# Patient Record
Sex: FEMALE | Race: WHITE | NOT HISPANIC OR LATINO | URBAN - METROPOLITAN AREA
[De-identification: names, ages, dates, MRNs, and addresses within clinical notes are randomized per-mention and may not be internally consistent; named-entity substitution may affect disease eponyms.]

---

## 2017-01-03 ENCOUNTER — COMMUNICATION - HEALTHEAST (OUTPATIENT)
Dept: PEDIATRICS | Facility: CLINIC | Age: 82
End: 2017-01-03

## 2017-01-11 ASSESSMENT — MIFFLIN-ST. JEOR: SCORE: 1072.72

## 2017-01-12 ENCOUNTER — ANESTHESIA - HEALTHEAST (OUTPATIENT)
Dept: SURGERY | Facility: CLINIC | Age: 82
End: 2017-01-12

## 2017-01-13 ENCOUNTER — SURGERY - HEALTHEAST (OUTPATIENT)
Dept: SURGERY | Facility: CLINIC | Age: 82
End: 2017-01-13

## 2017-01-14 ASSESSMENT — MIFFLIN-ST. JEOR: SCORE: 1072.72

## 2017-01-15 ENCOUNTER — HOME CARE/HOSPICE - HEALTHEAST (OUTPATIENT)
Dept: HOME HEALTH SERVICES | Facility: HOME HEALTH | Age: 82
End: 2017-01-15

## 2017-01-15 ASSESSMENT — MIFFLIN-ST. JEOR: SCORE: 1110.14

## 2017-01-17 ENCOUNTER — COMMUNICATION - HEALTHEAST (OUTPATIENT)
Dept: INTERNAL MEDICINE | Facility: CLINIC | Age: 82
End: 2017-01-17

## 2017-01-18 ENCOUNTER — COMMUNICATION - HEALTHEAST (OUTPATIENT)
Dept: INTERNAL MEDICINE | Facility: CLINIC | Age: 82
End: 2017-01-18

## 2017-01-19 ENCOUNTER — HOME CARE/HOSPICE - HEALTHEAST (OUTPATIENT)
Dept: HOME HEALTH SERVICES | Facility: HOME HEALTH | Age: 82
End: 2017-01-19

## 2017-01-19 ENCOUNTER — OFFICE VISIT - HEALTHEAST (OUTPATIENT)
Dept: INTERNAL MEDICINE | Facility: CLINIC | Age: 82
End: 2017-01-19

## 2017-01-19 DIAGNOSIS — M48.061 LUMBAR SPINAL STENOSIS: ICD-10-CM

## 2017-01-19 DIAGNOSIS — I25.10 CORONARY ATHEROSCLEROSIS: ICD-10-CM

## 2017-01-19 DIAGNOSIS — D64.9 ANEMIA: ICD-10-CM

## 2017-01-19 DIAGNOSIS — N18.30 CHRONIC KIDNEY DISEASE, STAGE III (MODERATE) (H): ICD-10-CM

## 2017-01-19 DIAGNOSIS — I10 ESSENTIAL HYPERTENSION: ICD-10-CM

## 2017-01-19 DIAGNOSIS — E78.5 DYSLIPIDEMIA, GOAL LDL BELOW 70: ICD-10-CM

## 2017-01-19 DIAGNOSIS — E03.9 HYPOTHYROIDISM: ICD-10-CM

## 2017-01-19 DIAGNOSIS — Z98.890 STATUS POST LUMBAR SPINE OPERATION: ICD-10-CM

## 2017-01-19 RX ORDER — NITROGLYCERIN 0.4 MG/1
0.4 TABLET SUBLINGUAL EVERY 5 MIN PRN
Qty: 100 TABLET | Refills: 0 | Status: SHIPPED | OUTPATIENT
Start: 2017-01-19

## 2017-01-20 ENCOUNTER — COMMUNICATION - HEALTHEAST (OUTPATIENT)
Dept: HOME HEALTH SERVICES | Facility: HOME HEALTH | Age: 82
End: 2017-01-20

## 2017-01-24 ENCOUNTER — RECORDS - HEALTHEAST (OUTPATIENT)
Dept: ADMINISTRATIVE | Facility: OTHER | Age: 82
End: 2017-01-24

## 2017-01-26 ENCOUNTER — COMMUNICATION - HEALTHEAST (OUTPATIENT)
Dept: INTERNAL MEDICINE | Facility: CLINIC | Age: 82
End: 2017-01-26

## 2017-01-27 ENCOUNTER — COMMUNICATION - HEALTHEAST (OUTPATIENT)
Dept: SCHEDULING | Facility: CLINIC | Age: 82
End: 2017-01-27

## 2017-01-27 ENCOUNTER — OFFICE VISIT - HEALTHEAST (OUTPATIENT)
Dept: FAMILY MEDICINE | Facility: CLINIC | Age: 82
End: 2017-01-27

## 2017-01-27 DIAGNOSIS — R05.9 COUGH: ICD-10-CM

## 2017-02-04 ENCOUNTER — RECORDS - HEALTHEAST (OUTPATIENT)
Dept: ADMINISTRATIVE | Facility: OTHER | Age: 82
End: 2017-02-04

## 2017-02-06 ENCOUNTER — RECORDS - HEALTHEAST (OUTPATIENT)
Dept: ADMINISTRATIVE | Facility: OTHER | Age: 82
End: 2017-02-06

## 2017-02-13 ENCOUNTER — RECORDS - HEALTHEAST (OUTPATIENT)
Dept: ADMINISTRATIVE | Facility: OTHER | Age: 82
End: 2017-02-13

## 2017-02-13 ENCOUNTER — COMMUNICATION - HEALTHEAST (OUTPATIENT)
Dept: INTERNAL MEDICINE | Facility: CLINIC | Age: 82
End: 2017-02-13

## 2017-02-14 ENCOUNTER — RECORDS - HEALTHEAST (OUTPATIENT)
Dept: ADMINISTRATIVE | Facility: OTHER | Age: 82
End: 2017-02-14

## 2017-02-17 ENCOUNTER — AMBULATORY - HEALTHEAST (OUTPATIENT)
Dept: LAB | Facility: CLINIC | Age: 82
End: 2017-02-17

## 2017-02-17 DIAGNOSIS — D64.9 ANEMIA: ICD-10-CM

## 2017-02-18 ENCOUNTER — COMMUNICATION - HEALTHEAST (OUTPATIENT)
Dept: INTERNAL MEDICINE | Facility: CLINIC | Age: 82
End: 2017-02-18

## 2017-02-23 ENCOUNTER — OFFICE VISIT - HEALTHEAST (OUTPATIENT)
Dept: INTERNAL MEDICINE | Facility: CLINIC | Age: 82
End: 2017-02-23

## 2017-02-23 DIAGNOSIS — J30.81 ALLERGIC RHINITIS DUE TO DOG HAIR: ICD-10-CM

## 2017-02-23 DIAGNOSIS — I10 ESSENTIAL HYPERTENSION WITH GOAL BLOOD PRESSURE LESS THAN 140/90: ICD-10-CM

## 2017-02-23 RX ORDER — LANOLIN ALCOHOL/MO/W.PET/CERES
3 CREAM (GRAM) TOPICAL
Status: SHIPPED | COMMUNITY
Start: 2017-02-20

## 2017-02-23 ASSESSMENT — MIFFLIN-ST. JEOR: SCORE: 1040.97

## 2017-04-25 ENCOUNTER — OFFICE VISIT - HEALTHEAST (OUTPATIENT)
Dept: INTERNAL MEDICINE | Facility: CLINIC | Age: 82
End: 2017-04-25

## 2017-04-25 DIAGNOSIS — R06.7 SNEEZING WITH WATERY EYES: ICD-10-CM

## 2017-04-25 DIAGNOSIS — H04.209 SNEEZING WITH WATERY EYES: ICD-10-CM

## 2017-04-25 DIAGNOSIS — R09.81 MILD NASAL CONGESTION: ICD-10-CM

## 2017-04-25 DIAGNOSIS — J30.9 ALLERGIC RHINITIS: ICD-10-CM

## 2017-04-25 ASSESSMENT — MIFFLIN-ST. JEOR: SCORE: 1045.5

## 2017-04-26 ENCOUNTER — RECORDS - HEALTHEAST (OUTPATIENT)
Dept: ADMINISTRATIVE | Facility: OTHER | Age: 82
End: 2017-04-26

## 2017-05-09 ENCOUNTER — COMMUNICATION - HEALTHEAST (OUTPATIENT)
Dept: SCHEDULING | Facility: CLINIC | Age: 82
End: 2017-05-09

## 2017-05-11 ENCOUNTER — OFFICE VISIT - HEALTHEAST (OUTPATIENT)
Dept: INTERNAL MEDICINE | Facility: CLINIC | Age: 82
End: 2017-05-11

## 2017-05-11 DIAGNOSIS — R51.9 CHRONIC HEADACHES: ICD-10-CM

## 2017-05-11 DIAGNOSIS — I10 ESSENTIAL HYPERTENSION WITH GOAL BLOOD PRESSURE LESS THAN 140/90: ICD-10-CM

## 2017-05-11 DIAGNOSIS — G89.29 CHRONIC HEADACHES: ICD-10-CM

## 2017-05-11 ASSESSMENT — MIFFLIN-ST. JEOR: SCORE: 1059.11

## 2017-05-12 ENCOUNTER — COMMUNICATION - HEALTHEAST (OUTPATIENT)
Dept: INTERNAL MEDICINE | Facility: CLINIC | Age: 82
End: 2017-05-12

## 2017-05-19 ENCOUNTER — COMMUNICATION - HEALTHEAST (OUTPATIENT)
Dept: INTERNAL MEDICINE | Facility: CLINIC | Age: 82
End: 2017-05-19

## 2017-05-23 ENCOUNTER — AMBULATORY - HEALTHEAST (OUTPATIENT)
Dept: INTERNAL MEDICINE | Facility: CLINIC | Age: 82
End: 2017-05-23

## 2017-05-25 ENCOUNTER — AMBULATORY - HEALTHEAST (OUTPATIENT)
Dept: INTERNAL MEDICINE | Facility: CLINIC | Age: 82
End: 2017-05-25

## 2017-05-25 DIAGNOSIS — G89.29 CHRONIC HEADACHES: ICD-10-CM

## 2017-05-25 DIAGNOSIS — R51.9 CHRONIC HEADACHES: ICD-10-CM

## 2017-07-24 ENCOUNTER — COMMUNICATION - HEALTHEAST (OUTPATIENT)
Dept: INTERNAL MEDICINE | Facility: CLINIC | Age: 82
End: 2017-07-24

## 2017-08-21 ENCOUNTER — COMMUNICATION - HEALTHEAST (OUTPATIENT)
Dept: SCHEDULING | Facility: CLINIC | Age: 82
End: 2017-08-21

## 2017-08-21 ENCOUNTER — HOSPITAL ENCOUNTER (OUTPATIENT)
Dept: LAB | Age: 82
Setting detail: SPECIMEN
Discharge: HOME | End: 2017-08-21

## 2017-08-21 ENCOUNTER — HOME CARE/HOSPICE - HEALTHEAST (OUTPATIENT)
Dept: HOME HEALTH SERVICES | Facility: HOME HEALTH | Age: 82
End: 2017-08-21

## 2017-08-21 ENCOUNTER — COMMUNICATION - HEALTHEAST (OUTPATIENT)
Dept: HOME HEALTH SERVICES | Facility: HOME HEALTH | Age: 82
End: 2017-08-21

## 2017-08-21 ENCOUNTER — OFFICE VISIT - HEALTHEAST (OUTPATIENT)
Dept: INTERNAL MEDICINE | Facility: CLINIC | Age: 82
End: 2017-08-21

## 2017-08-21 DIAGNOSIS — H35.30 MACULAR DEGENERATION: ICD-10-CM

## 2017-08-21 DIAGNOSIS — R51.9 FREQUENT HEADACHES: ICD-10-CM

## 2017-08-21 DIAGNOSIS — I10 HTN (HYPERTENSION): ICD-10-CM

## 2017-08-21 DIAGNOSIS — K59.00 CONSTIPATION: ICD-10-CM

## 2017-08-21 DIAGNOSIS — K92.1 BLOOD IN STOOL: ICD-10-CM

## 2017-09-07 ENCOUNTER — OFFICE VISIT - HEALTHEAST (OUTPATIENT)
Dept: INTERNAL MEDICINE | Facility: CLINIC | Age: 82
End: 2017-09-07

## 2017-09-07 DIAGNOSIS — L60.0 INGROWN NAIL OF GREAT TOE OF LEFT FOOT: ICD-10-CM

## 2017-09-11 ENCOUNTER — COMMUNICATION - HEALTHEAST (OUTPATIENT)
Dept: INTERNAL MEDICINE | Facility: CLINIC | Age: 82
End: 2017-09-11

## 2017-09-12 ENCOUNTER — OFFICE VISIT - HEALTHEAST (OUTPATIENT)
Dept: INTERNAL MEDICINE | Facility: CLINIC | Age: 82
End: 2017-09-12

## 2017-09-12 DIAGNOSIS — L60.0 INGROWN NAIL OF GREAT TOE OF LEFT FOOT: ICD-10-CM

## 2017-10-16 ENCOUNTER — COMMUNICATION - HEALTHEAST (OUTPATIENT)
Dept: INTERNAL MEDICINE | Facility: CLINIC | Age: 82
End: 2017-10-16

## 2017-10-18 ENCOUNTER — RECORDS - HEALTHEAST (OUTPATIENT)
Dept: ADMINISTRATIVE | Facility: OTHER | Age: 82
End: 2017-10-18

## 2017-11-06 ENCOUNTER — HOSPITAL ENCOUNTER (OUTPATIENT)
Dept: MRI IMAGING | Facility: HOSPITAL | Age: 82
Discharge: HOME | End: 2017-11-06
Attending: PSYCHIATRY & NEUROLOGY

## 2017-11-06 DIAGNOSIS — G44.40 REBOUND HEADACHE: ICD-10-CM

## 2017-11-09 ENCOUNTER — COMMUNICATION - HEALTHEAST (OUTPATIENT)
Dept: INTERNAL MEDICINE | Facility: CLINIC | Age: 82
End: 2017-11-09

## 2017-12-19 ENCOUNTER — RECORDS - HEALTHEAST (OUTPATIENT)
Dept: ADMINISTRATIVE | Facility: OTHER | Age: 82
End: 2017-12-19

## 2018-01-05 ENCOUNTER — COMMUNICATION - HEALTHEAST (OUTPATIENT)
Dept: INTERNAL MEDICINE | Facility: CLINIC | Age: 83
End: 2018-01-05

## 2018-01-05 ENCOUNTER — RECORDS - HEALTHEAST (OUTPATIENT)
Dept: ADMINISTRATIVE | Facility: OTHER | Age: 83
End: 2018-01-05

## 2018-01-16 ENCOUNTER — COMMUNICATION - HEALTHEAST (OUTPATIENT)
Dept: INTERNAL MEDICINE | Facility: CLINIC | Age: 83
End: 2018-01-16

## 2018-01-18 ENCOUNTER — OFFICE VISIT - HEALTHEAST (OUTPATIENT)
Dept: FAMILY MEDICINE | Facility: CLINIC | Age: 83
End: 2018-01-18

## 2018-01-18 DIAGNOSIS — J30.81 ALLERGIC RHINITIS DUE TO DOG HAIR: ICD-10-CM

## 2018-01-18 DIAGNOSIS — R05.9 COUGH: ICD-10-CM

## 2018-01-18 DIAGNOSIS — I10 HYPERTENSION: ICD-10-CM

## 2018-01-18 DIAGNOSIS — M25.50 JOINT PAIN: ICD-10-CM

## 2018-01-18 LAB
ANION GAP SERPL CALCULATED.3IONS-SCNC: 9 MMOL/L (ref 5–18)
BUN SERPL-MCNC: 19 MG/DL (ref 8–28)
CALCIUM SERPL-MCNC: 9 MG/DL (ref 8.5–10.5)
CHLORIDE BLD-SCNC: 101 MMOL/L (ref 98–107)
CO2 SERPL-SCNC: 27 MMOL/L (ref 22–31)
CREAT SERPL-MCNC: 0.98 MG/DL (ref 0.6–1.1)
GFR SERPL CREATININE-BSD FRML MDRD: 54 ML/MIN/1.73M2
GLUCOSE BLD-MCNC: 94 MG/DL (ref 70–125)
POTASSIUM BLD-SCNC: 4.6 MMOL/L (ref 3.5–5)
SODIUM SERPL-SCNC: 137 MMOL/L (ref 136–145)

## 2018-02-01 ENCOUNTER — COMMUNICATION - HEALTHEAST (OUTPATIENT)
Dept: INTERNAL MEDICINE | Facility: CLINIC | Age: 83
End: 2018-02-01

## 2018-03-09 ENCOUNTER — COMMUNICATION - HEALTHEAST (OUTPATIENT)
Dept: FAMILY MEDICINE | Facility: CLINIC | Age: 83
End: 2018-03-09

## 2018-03-15 ENCOUNTER — OFFICE VISIT - HEALTHEAST (OUTPATIENT)
Dept: FAMILY MEDICINE | Facility: CLINIC | Age: 83
End: 2018-03-15

## 2018-03-15 DIAGNOSIS — I49.9 IRREGULAR HEART RHYTHM: ICD-10-CM

## 2018-03-15 DIAGNOSIS — I10 ESSENTIAL HYPERTENSION: ICD-10-CM

## 2018-03-15 DIAGNOSIS — R60.9 EDEMA: ICD-10-CM

## 2018-03-15 DIAGNOSIS — E03.9 HYPOTHYROIDISM: ICD-10-CM

## 2018-03-15 LAB
ANION GAP SERPL CALCULATED.3IONS-SCNC: 9 MMOL/L (ref 5–18)
ATRIAL RATE - MUSE: 70 BPM
BUN SERPL-MCNC: 18 MG/DL (ref 8–28)
CALCIUM SERPL-MCNC: 9 MG/DL (ref 8.5–10.5)
CHLORIDE BLD-SCNC: 100 MMOL/L (ref 98–107)
CO2 SERPL-SCNC: 28 MMOL/L (ref 22–31)
CREAT SERPL-MCNC: 0.95 MG/DL (ref 0.6–1.1)
DIASTOLIC BLOOD PRESSURE - MUSE: NORMAL MMHG
GFR SERPL CREATININE-BSD FRML MDRD: 56 ML/MIN/1.73M2
GLUCOSE BLD-MCNC: 72 MG/DL (ref 70–125)
INTERPRETATION ECG - MUSE: NORMAL
P AXIS - MUSE: 69 DEGREES
POTASSIUM BLD-SCNC: 4.2 MMOL/L (ref 3.5–5)
PR INTERVAL - MUSE: 210 MS
QRS DURATION - MUSE: 76 MS
QT - MUSE: 394 MS
QTC - MUSE: 425 MS
R AXIS - MUSE: 92 DEGREES
SODIUM SERPL-SCNC: 137 MMOL/L (ref 136–145)
SYSTOLIC BLOOD PRESSURE - MUSE: NORMAL MMHG
T AXIS - MUSE: 63 DEGREES
TSH SERPL DL<=0.005 MIU/L-ACNC: 2.82 UIU/ML (ref 0.3–5)
VENTRICULAR RATE- MUSE: 70 BPM

## 2018-03-16 ENCOUNTER — COMMUNICATION - HEALTHEAST (OUTPATIENT)
Dept: FAMILY MEDICINE | Facility: CLINIC | Age: 83
End: 2018-03-16

## 2018-04-02 ENCOUNTER — OFFICE VISIT - HEALTHEAST (OUTPATIENT)
Dept: FAMILY MEDICINE | Facility: CLINIC | Age: 83
End: 2018-04-02

## 2018-04-02 DIAGNOSIS — M25.551 RIGHT HIP PAIN: ICD-10-CM

## 2018-04-02 DIAGNOSIS — M54.9 BACK PAIN: ICD-10-CM

## 2018-04-02 DIAGNOSIS — F32.A DEPRESSION: ICD-10-CM

## 2018-04-02 RX ORDER — DULOXETIN HYDROCHLORIDE 20 MG/1
20 CAPSULE, DELAYED RELEASE ORAL DAILY
Qty: 30 CAPSULE | Refills: 2 | Status: SHIPPED | OUTPATIENT
Start: 2018-04-02

## 2018-04-11 ENCOUNTER — COMMUNICATION - HEALTHEAST (OUTPATIENT)
Dept: FAMILY MEDICINE | Facility: CLINIC | Age: 83
End: 2018-04-11

## 2018-04-11 ENCOUNTER — RECORDS - HEALTHEAST (OUTPATIENT)
Dept: ADMINISTRATIVE | Facility: OTHER | Age: 83
End: 2018-04-11

## 2018-04-11 DIAGNOSIS — M25.50 JOINT PAIN: ICD-10-CM

## 2018-08-14 ENCOUNTER — RECORDS - HEALTHEAST (OUTPATIENT)
Dept: ADMINISTRATIVE | Facility: OTHER | Age: 83
End: 2018-08-14

## 2018-08-20 ENCOUNTER — OFFICE VISIT - HEALTHEAST (OUTPATIENT)
Dept: FAMILY MEDICINE | Facility: CLINIC | Age: 83
End: 2018-08-20

## 2018-08-20 DIAGNOSIS — M25.50 JOINT PAIN: ICD-10-CM

## 2018-08-20 DIAGNOSIS — M79.662 PAIN OF LEFT LOWER LEG: ICD-10-CM

## 2018-08-20 DIAGNOSIS — M54.9 BACK PAIN: ICD-10-CM

## 2018-08-20 DIAGNOSIS — I10 HYPERTENSION: ICD-10-CM

## 2018-08-20 DIAGNOSIS — R60.9 EDEMA: ICD-10-CM

## 2018-08-20 DIAGNOSIS — R07.9 CHEST PAIN: ICD-10-CM

## 2018-08-20 DIAGNOSIS — E78.5 DYSLIPIDEMIA, GOAL LDL BELOW 70: ICD-10-CM

## 2018-08-20 DIAGNOSIS — N18.30 CHRONIC KIDNEY DISEASE, STAGE III (MODERATE) (H): ICD-10-CM

## 2018-08-20 DIAGNOSIS — E03.9 HYPOTHYROIDISM: ICD-10-CM

## 2018-08-20 DIAGNOSIS — Z00.01 ENCOUNTER FOR GENERAL ADULT MEDICAL EXAMINATION WITH ABNORMAL FINDINGS: ICD-10-CM

## 2018-08-20 LAB
ALBUMIN SERPL-MCNC: 3.7 G/DL (ref 3.5–5)
ALP SERPL-CCNC: 114 U/L (ref 45–120)
ALT SERPL W P-5'-P-CCNC: 12 U/L (ref 0–45)
ANION GAP SERPL CALCULATED.3IONS-SCNC: 11 MMOL/L (ref 5–18)
AST SERPL W P-5'-P-CCNC: 20 U/L (ref 0–40)
BILIRUB SERPL-MCNC: 0.4 MG/DL (ref 0–1)
BUN SERPL-MCNC: 16 MG/DL (ref 8–28)
CALCIUM SERPL-MCNC: 9.6 MG/DL (ref 8.5–10.5)
CHLORIDE BLD-SCNC: 102 MMOL/L (ref 98–107)
CHOLEST SERPL-MCNC: 287 MG/DL
CO2 SERPL-SCNC: 26 MMOL/L (ref 22–31)
CREAT SERPL-MCNC: 0.92 MG/DL (ref 0.6–1.1)
FASTING STATUS PATIENT QL REPORTED: YES
GFR SERPL CREATININE-BSD FRML MDRD: 58 ML/MIN/1.73M2
GLUCOSE BLD-MCNC: 91 MG/DL (ref 70–125)
HDLC SERPL-MCNC: 38 MG/DL
LDLC SERPL CALC-MCNC: 210 MG/DL
POTASSIUM BLD-SCNC: 4.6 MMOL/L (ref 3.5–5)
PROT SERPL-MCNC: 6.5 G/DL (ref 6–8)
SODIUM SERPL-SCNC: 139 MMOL/L (ref 136–145)
TRIGL SERPL-MCNC: 195 MG/DL
TSH SERPL DL<=0.005 MIU/L-ACNC: 0.92 UIU/ML (ref 0.3–5)

## 2018-08-20 RX ORDER — TRAMADOL HYDROCHLORIDE 50 MG/1
50 TABLET ORAL EVERY 6 HOURS PRN
Qty: 20 TABLET | Refills: 0 | Status: SHIPPED | OUTPATIENT
Start: 2018-08-20

## 2018-08-21 ENCOUNTER — COMMUNICATION - HEALTHEAST (OUTPATIENT)
Dept: FAMILY MEDICINE | Facility: CLINIC | Age: 83
End: 2018-08-21

## 2018-08-23 ENCOUNTER — COMMUNICATION - HEALTHEAST (OUTPATIENT)
Dept: SCHEDULING | Facility: CLINIC | Age: 83
End: 2018-08-23

## 2018-08-29 ENCOUNTER — COMMUNICATION - HEALTHEAST (OUTPATIENT)
Dept: FAMILY MEDICINE | Facility: CLINIC | Age: 83
End: 2018-08-29

## 2018-09-10 ENCOUNTER — COMMUNICATION - HEALTHEAST (OUTPATIENT)
Dept: FAMILY MEDICINE | Facility: CLINIC | Age: 83
End: 2018-09-10

## 2018-09-14 ENCOUNTER — RECORDS - HEALTHEAST (OUTPATIENT)
Dept: ADMINISTRATIVE | Facility: OTHER | Age: 83
End: 2018-09-14

## 2018-10-24 ENCOUNTER — COMMUNICATION - HEALTHEAST (OUTPATIENT)
Dept: FAMILY MEDICINE | Facility: CLINIC | Age: 83
End: 2018-10-24

## 2018-10-24 DIAGNOSIS — E03.9 HYPOTHYROIDISM: ICD-10-CM

## 2018-10-24 DIAGNOSIS — E78.5 DYSLIPIDEMIA, GOAL LDL BELOW 70: ICD-10-CM

## 2018-10-24 DIAGNOSIS — R60.9 EDEMA: ICD-10-CM

## 2018-10-24 DIAGNOSIS — I10 HYPERTENSION: ICD-10-CM

## 2018-10-24 DIAGNOSIS — J30.81 ALLERGIC RHINITIS DUE TO DOG HAIR: ICD-10-CM

## 2018-10-26 RX ORDER — LEVOTHYROXINE SODIUM 125 UG/1
125 TABLET ORAL DAILY
Qty: 90 TABLET | Refills: 1 | Status: SHIPPED | OUTPATIENT
Start: 2018-10-26

## 2018-10-26 RX ORDER — FUROSEMIDE 20 MG
20 TABLET ORAL DAILY
Qty: 90 TABLET | Refills: 0 | Status: SHIPPED | OUTPATIENT
Start: 2018-10-26

## 2018-10-26 RX ORDER — ATORVASTATIN CALCIUM 40 MG/1
40 TABLET, FILM COATED ORAL DAILY
Qty: 90 TABLET | Refills: 0 | Status: SHIPPED | OUTPATIENT
Start: 2018-10-26

## 2018-12-04 ENCOUNTER — COMMUNICATION - HEALTHEAST (OUTPATIENT)
Dept: FAMILY MEDICINE | Facility: CLINIC | Age: 83
End: 2018-12-04

## 2018-12-04 DIAGNOSIS — R60.9 EDEMA: ICD-10-CM

## 2018-12-04 DIAGNOSIS — E78.5 DYSLIPIDEMIA, GOAL LDL BELOW 70: ICD-10-CM

## 2018-12-05 RX ORDER — NYSTATIN 100000 U/G
CREAM TOPICAL 2 TIMES DAILY PRN
Qty: 30 G | Refills: 0 | Status: SHIPPED | OUTPATIENT
Start: 2018-12-05

## 2018-12-07 RX ORDER — CLOTRIMAZOLE AND BETAMETHASONE DIPROPIONATE 10; .64 MG/G; MG/G
CREAM TOPICAL
Qty: 45 G | Refills: 0 | Status: SHIPPED | OUTPATIENT
Start: 2018-12-07

## 2019-06-05 ENCOUNTER — COMMUNICATION - HEALTHEAST (OUTPATIENT)
Dept: FAMILY MEDICINE | Facility: CLINIC | Age: 84
End: 2019-06-05

## 2019-06-05 DIAGNOSIS — I10 HYPERTENSION: ICD-10-CM

## 2019-06-05 RX ORDER — LISINOPRIL 10 MG/1
10 TABLET ORAL DAILY
Qty: 90 TABLET | Refills: 0 | Status: SHIPPED | OUTPATIENT
Start: 2019-06-05

## 2021-05-30 VITALS — HEIGHT: 60 IN | BODY MASS INDEX: 29.84 KG/M2 | WEIGHT: 152 LBS

## 2021-05-30 VITALS — BODY MASS INDEX: 32.83 KG/M2 | WEIGHT: 167.25 LBS | HEIGHT: 60 IN

## 2021-05-30 VITALS — BODY MASS INDEX: 30.04 KG/M2 | WEIGHT: 153 LBS | HEIGHT: 60 IN

## 2021-05-30 VITALS — WEIGHT: 156 LBS | BODY MASS INDEX: 30.63 KG/M2 | HEIGHT: 60 IN

## 2021-05-30 VITALS — BODY MASS INDEX: 33.16 KG/M2 | WEIGHT: 169.8 LBS

## 2021-05-30 VITALS — WEIGHT: 163 LBS | BODY MASS INDEX: 31.83 KG/M2

## 2021-05-31 ENCOUNTER — RECORDS - HEALTHEAST (OUTPATIENT)
Dept: ADMINISTRATIVE | Facility: CLINIC | Age: 86
End: 2021-05-31

## 2021-05-31 VITALS — WEIGHT: 163 LBS | BODY MASS INDEX: 31.83 KG/M2

## 2021-05-31 VITALS — WEIGHT: 161 LBS | BODY MASS INDEX: 31.44 KG/M2

## 2021-05-31 VITALS — BODY MASS INDEX: 30.94 KG/M2 | WEIGHT: 158.4 LBS

## 2021-05-31 VITALS — WEIGHT: 160.8 LBS | BODY MASS INDEX: 31.4 KG/M2

## 2021-06-01 ENCOUNTER — RECORDS - HEALTHEAST (OUTPATIENT)
Dept: ADMINISTRATIVE | Facility: CLINIC | Age: 86
End: 2021-06-01

## 2021-06-01 VITALS — HEIGHT: 60 IN | BODY MASS INDEX: 31.42 KG/M2

## 2021-06-01 VITALS — BODY MASS INDEX: 32.42 KG/M2 | WEIGHT: 166 LBS

## 2021-06-01 VITALS — BODY MASS INDEX: 31.42 KG/M2 | WEIGHT: 160.9 LBS

## 2021-06-08 NOTE — ANESTHESIA PREPROCEDURE EVALUATION
Anesthesia Evaluation      Patient summary reviewed   No history of anesthetic complications     Airway   Mallampati: II  Neck ROM: full   Pulmonary - normal exam    breath sounds clear to auscultation  (+) a smoker                         Cardiovascular - normal exam  (+) hypertension well controlled, CAD, dysrhythmias, ,     Rhythm: regular  Rate: normal,         Neuro/Psych    (+) neuromuscular disease,      Endo/Other    (+) hypothyroidism, arthritis,      GI/Hepatic/Renal    (+)   chronic renal disease CRI,           Dental - normal exam   (+) chipped                       Anesthesia Plan  Planned anesthetic: general endotracheal    ASA 3   Induction: intravenous   Anesthetic plan and risks discussed with: patient, spouse and child/children    Post-op plan: routine recovery

## 2021-06-08 NOTE — ANESTHESIA POSTPROCEDURE EVALUATION
Patient: Dorita Mason  BILATERAL POSTERIOR SPINAL FUSION L3-4 AND L4-5 WITH BILATERAL LATERAL RECESS DECOMPRESSION L3-4, BILATERAL LAMINECTOMY AND DECOMPRESSION L4-5 WITH BONE MARROW ASPIRATE   Anesthesia type: general    Patient location: PACU  Last vitals:   Vitals:    01/13/17 1407   BP:    Pulse: (!) 59   Resp:    Temp:    SpO2: 92%     Post vital signs: stable  Level of consciousness: awake and responds to simple questions  Post-anesthesia pain: pain controlled  Post-anesthesia nausea and vomiting: no  Pulmonary: unassisted, return to baseline  Cardiovascular: stable and blood pressure at baseline  Hydration: adequate  Anesthetic events: no    QCDR Measures:  ASA# 11 - Natacha-op Cardiac Arrest: ASA11B - Patient did NOT experience unanticipated cardiac arrest  ASA# 12 - Natacha-op Mortality Rate: ASA12B - Patient did NOT die  ASA# 13 - PACU Re-Intubation Rate: ASA13B - Patient did NOT require a new airway mgmt  ASA# 10 - Composite Anes Safety: ASA10A - No serious adverse event  ASA# 38 - New Corneal Injury: ASA38A - No new exposure keratitis or corneal abrasion in PACU    Additional Notes:

## 2021-06-08 NOTE — ANESTHESIA CARE TRANSFER NOTE
Last vitals:   Vitals:    01/13/17 1117   BP: 143/64   Pulse: 82   Resp: 12   Temp: 37  C (98.6  F)   SpO2: 100%     Patient's level of consciousness is drowsy  Spontaneous respirations: yes  Maintains airway independently: yes  Dentition unchanged: yes  Oropharynx: oropharynx clear of all foreign objects    QCDR Measures:  ASA# 20 - Surgical Safety Checklist: ASA20A - Safety Checks Done  PQRS# 430 - Adult PONV Prevention: 4558F - Pt received => 2 anti-emetic agents (different classes) preop & intraop  ASA# 8 - Peds PONV Prevention: NA - Not pediatric patient, not GA or 2 or more risk factors NOT present  PQRS# 424 - Natacha-op Temp Management: 4559F - At least one body temp DOCUMENTED => 35.5C or 95.9F within required timeframe  PQRS# 426 - PACU Transfer Protocol: - Transfer of care checklist used  ASA# 14 - Acute Post-op Pain: ASA14B - Patient did NOT experience pain >= 7 out of 10     In PACU, VSS.    I completed my SBAR handoff to the receiving nurse per policy and procedure.

## 2021-06-08 NOTE — PROGRESS NOTES
"1/19/2017     She comes today with her daughter, Clarissa.  She and Clarissa live in the same household.  Clarissa is knowledgeable about her mother's medications and seems quite supportive.    Visit Vitals     /50 (Patient Site: Right Arm, Patient Position: Sitting, Cuff Size: Adult Regular)     Pulse 81     Wt 169 lb 12.8 oz (77 kg)     SpO2 99%     BMI 33.16 kg/m2       Past Medical History   Diagnosis Date     Abnormal mammogram - 2008 mammogram had two areas in left breast that were not fully defined. 12/15/2015     ARMD (age related macular degeneration) - both eyes. 12/15/2015     Chronic kidney disease      Chronic Kidney Disease, Stage 2      Coronary Artery Disease 2012     Harry Parmar: CT Angiogram showed 70-80% narrowing in proximal LAD      Decreased sense of vibration - the ankles don't perceive vibration with a tuning fork. 8/21/2015     Dyslipidemia, goal LDL below 70      Created by [facility] Annotation: Jul 13 2014  1:43PM - Kellie Cárdenas: on statin      Essential hypertension      Created by [facility] Annotation: May 27 2010 11:51AM - Kellie Cárdenas: controlled      Gallstone - \"Probable small gallstone\" on CT of 2/2/12 8/14/2015     Hypothyroidism      Created by [facility] Annotation: Nov 8 2007  5:50PM - Kellie Cárdenas: stable      Impaired joint position sense - the big toes don't perceive up versus down. 8/21/2015     Osteopenia      Created by [facility] Annotation: Jun 21 2009 11:06PM - Kellie Cárdenas: found on DEXA  scan done in 6/09      Tinnitus of both ears - the same in both ears - \"It's in my head.  I can't tell which ear it's coming from.\" 8/21/2015     TMJ Pain      Venous hum - left neck 8/21/2015       Social History     Social History     Marital status:      Spouse name: N/A     Number of children: 17     Years of education: N/A     Occupational History     Not on file.     Social History Main Topics     " "Smoking status: Former Smoker     Types: Cigarettes     Start date: 3/17/1947     Quit date: 3/17/1963     Smokeless tobacco: Never Used     Alcohol use No      Comment: Never a problem for her.  Rare wine.     Drug use: No     Sexual activity: Not on file     Other Topics Concern     Not on file     Social History Narrative       Family History   Problem Relation Age of Onset     Pacemaker Father      Stroke Father      Asthma Sister      COPD Sister      Pulmonary embolism Sister      Kidney cancer Brother      nephrectomy - ESRD     Other Daughter 2     Hit by car at age 22 months in 1968     Obesity Son      Coronary Stenting Son      Coronary Stenting Son      Coronary Stenting Son      Coronary Stenting Son      Other Daughter      Hit by her father's car, as a young child.     Coronary Stenting Brother      Lymphoma Sister        As part of this visit I have today personally reviewed past medical history, family medical history, social history (specifically including tobacco use), current medications and intolerances/allergies.  I have updated and/or or corrected these areas of history as may have been appropriate.    Allergies   Allergen Reactions     Shellfish Containing Products        Current Outpatient Prescriptions   Medication Sig Note     acetaminophen (TYLENOL) 500 MG tablet Take 1 tablet (500 mg total) by mouth every 4 (four) hours for 10 days.      ascorbic acid (ASCORBIC ACID WITH FERNANDO HIPS) 500 MG tablet Take 500 mg by mouth daily.       aspirin 81 MG EC tablet Take 81 mg by mouth daily.      clotrimazole-betamethasone (LOTRISONE) cream Apply topically bedtime.       cyclobenzaprine (FLEXERIL) 5 MG tablet Take 1 tablet (5 mg total) by mouth every 8 (eight) hours as needed for muscle spasms. 1/19/2017: Takes it \"usually she gets it two or three times a day.\"  \"Only when she has muscle issues.\"  This is per daughter.  1/19/2017      diazePAM (VALIUM) 2 MG tablet Take 0.5 tablets (1 mg total) by " "mouth every 6 (six) hours as needed. 1/19/2017: She can take half a pill every six hours.   I don't give it to her that often.  Yesterday (she had it three times).  1/19/2017      diclofenac sodium (VOLTAREN) 1 % Gel Apply topically 4 (four) times a day. To legs      docusate sodium (COLACE) 100 MG capsule Take 100 mg by mouth 2 (two) times a day as needed.       fluticasone (FLONASE) 50 mcg/actuation nasal spray 2 squirts, each nostril, qam (Patient taking differently: 2 sprays into each nostril daily as needed. 2 squirts, each nostril, qam)      furosemide (LASIX) 20 MG tablet Take 20 mg by mouth daily.      levothyroxine (SYNTHROID) 125 MCG tablet Take 1 tablet (125 mcg total) by mouth daily.      lisinopril (PRINIVIL,ZESTRIL) 10 MG tablet Take 10 mg by mouth daily.      multivitamin Chew Chew 1 tablet daily.      nitroglycerin (NITROSTAT) 0.4 MG SL tablet Place 0.4 mg under the tongue every 5 (five) minutes as needed for chest pain.      nystatin (MYCOSTATIN) cream Apply topically 2 (two) times a day.      omega-3 fatty acids-fish oil (OMEGA 3 FISH OIL) 684-1,200 mg CpDR Take 1 capsule by mouth 2 (two) times a day.       omeprazole (PRILOSEC OTC) 20 MG tablet Take 20 mg by mouth daily as needed.       oxyCODONE (ROXICODONE) 5 MG immediate release tablet Take 1-2 tablets (5-10 mg total) by mouth every 4 (four) hours as needed for pain. 1/19/2017: \"Yesterday she had it (at 5:30 AM, 9 AM, 12:45, 5:30, 9:00).\"  She can tell that this helps her pain.  1/19/2017      simvastatin (ZOCOR) 40 MG tablet Take 40 mg by mouth bedtime.      triamcinolone (KENALOG) 0.1 % cream Apply topically 2 (two) times a day.      vitamin A-vitamin C-vit E-min (OCUVITE) Tab tablet Take 1 tablet by mouth daily.        Patient Active Problem List   Diagnosis     Benign paroxysmal positional vertigo     Dyslipidemia, goal LDL below 70     Coronary Artery Disease     Stage 3 CKD - decreased kidney filtering efficiency     Hypothyroidism     " Essential hypertension     Osteopenia     Lumbar radiculopathy     Lichen Sclerosus Et Atrophicus     Diverticulosis     Hypovitaminosis D - less than 20 in 2009.   Normal is 30 - 100     ARMD (age related macular degeneration) - both eyes.     Floaters, right - this is very prominent on right, she says.  (? posterior vitreous detachment)     Epiretinal membrane, left eye [facility] 0- exam of 7/15/16 - no treatment recommended.     Lumbar spinal stenosis - Surgery on 1/13/17 - Dr. Tyler from [facility]     First degree AV block - slight delay in the internal messaging system in the heart     Spondylolisthesis at L4-L5 level       The following high BMI interventions were performed this visit: weight monitoring.  Given her postoperative status, this is not the time to embark on a dramatic weight loss program in my opinion.    Diabetes - she is not diabetic.  She has never had a hemoglobin A1c.    Lipid status - most recent lipids were abnormal.  She is now on simvastatin.  Lab Results   Component Value Date    CHOL 230 (H) 03/22/2016    CHOL 178 04/27/2015    CHOL 180 06/30/2014     Lab Results   Component Value Date    HDL 35 (L) 03/22/2016    HDL 33 (L) 04/27/2015    HDL 43 06/30/2014     Lab Results   Component Value Date    LDLCALC 159 (H) 03/22/2016    LDLCALC 106 04/27/2015    LDLCALC 116 06/30/2014     Lab Results   Component Value Date    TRIG 180 (H) 03/22/2016    TRIG 193 (H) 04/27/2015    TRIG 106 06/30/2014     Thyroid status - most recent TSH was satisfactory.  It is been within 1 year.  She is on 125  g of levothyroxine.  Lab Results   Component Value Date    TSH 1.98 12/28/2016       CBC monitoring - she is anemia.  Was as high as 13+ prior to surgery.  Now in the 8+ range.  Feel from 10.0 to 8+ in the few days at the end of her hospital stay.    Chief complaint: Recent hospitalization for multiple level spinal fusion    Present illness: Dr. Tyler recently did her surgery.  She has had  "postoperative discomfort and muscle spasm.  She has cyclobenzaprine, diazepam, and oxycodone available.  She has had some constipation.  She has back pain, not surprisingly.      CVS - palpitations, \"tap dance\" after I eat food.  Has been a little lightheaded at times.  Syncope - denied.  MI - she claims a \"long long time ago in 1996 I supposedly had a mild heart attack.\"  Edema - denied.  Dyspnea - sometimes she has to stop.  Every once in a while she has to take deep breaths while she is sitting.  She carries a diagnosis of coronary artery disease.  Pulmonary - cough is denied.  Congestion - denied.  Wheeze - denied.  Hemoptysis - denied.  Leg (below the knee) pain - \"the left one does sometimes, yes.\"  \"It just comes every now and again.  It's just been since the surgery.\"  Her right knee is painful from DJD.  She has apparently had viscosupplementation (from her description.\"  Vision - she thought she was being cross-eyed and was seeing double.  It has \"pretty much\" gone away.  She has ARMD and has blurred vision because of that.  NM - she is having anterior thigh sensations that sound like postop nerve root irritation.  She has some crampy feelings of the buttocks.  GI-she has recently had some constipation, almost certainly because of her oxycodone.  I discussed senna-based laxatives with her.  See After Visit Summary.    Anemia-her blood count as follows significantly as a result of her surgery.  It is too soon to get a follow-up, although I ordered one for the future.  Lab Results   Component Value Date    WBC 8.2 01/14/2017    HGB 8.5 (L) 01/15/2017    HCT 24.8 (L) 01/14/2017    MCV 85 01/14/2017     01/14/2017     CMP - she has minimal stage 3 chronic kidney disease.  Her albumin and protein are low.  This will need to be repeated sometime in the future but is likely not to be reliable at this time because of operative blood loss.  Results for orders placed or performed in visit on 12/28/16 "   Comprehensive Metabolic Panel   Result Value Ref Range    Sodium 141 136 - 145 mmol/L    Potassium 4.5 3.5 - 5.0 mmol/L    Chloride 106 98 - 107 mmol/L    CO2 26 22 - 31 mmol/L    Anion Gap, Calculation 9 5 - 18 mmol/L    Glucose 77 70 - 125 mg/dL    BUN 15 8 - 28 mg/dL    Creatinine 0.94 0.60 - 1.10 mg/dL    GFR MDRD Af Amer >60 >60 mL/min/1.73m2    GFR MDRD Non Af Amer 57 (L) >60 mL/min/1.73m2    Bilirubin, Total 0.3 0.0 - 1.0 mg/dL    Calcium 9.2 8.5 - 10.5 mg/dL    Protein, Total 5.6 (L) 6.0 - 8.0 g/dL    Albumin 3.2 (L) 3.5 - 5.0 g/dL    Alkaline Phosphatase 103 45 - 120 U/L    AST 22 0 - 40 U/L    ALT 16 0 - 45 U/L     Physical examination  General-this is an 83-year-old  American woman, obese, and in no obvious distress.  She is wearing a back brace.  Left leg - took JULIAN hose down.  Not red.  Not indurated.  No tense/tight.  Calf squeeze/palpation between the heads of the gastrocs and Danni's (the last of these of no real utility, but done out of force of habit) are all benign.  Chest - CTA.  I hear no rales, rhonchi, or wheezes.  Heart - regular and without murmur.  Ext - no edema.  She is wearing JULIAN hose.          Impression    1.  Recent multiple level spinal surgery.  2.  Obesity  3.  Constipation due to narcotics.  4.  Postoperative pain and muscle spasm.  5.  Hypercholesterolemia, treated.  6.  Blood loss anemia associated with her back surgery.  7.  Low albumin and globulin.    Plan    1.  Future CBC ordered.  2.  Discussion regarding constipation treatment.  3.  Discussion regarding cyclobenzaprine, diazepam, and Roxicodone.  All of these prescription should come from Dr. Verde.  4.  She was not set up for home care visits when she left the hospital, for reasons I don't fully understand.  I put in referral this morning and provided the face-to-face information.  I filled out the form, I had the sense that there probably was something missing, but I note wouldn't let me sign the referral if  I filled in everything, so I will simply wait to see whether something comes back for additional review.  5.  I answered all of her questions.  6.  Return visit scheduled.  7.  Today's visit was scheduled for an lasted all of 40 minutes.  Greater than 50% of this visit was devoted to counseling and coordination of care.      Much or all of the text in this note was generated through the use of Dragon Dictate voice-to-text software.  Errors in spelling or words which seem out of context are unintentional.  Dragon has a significant issue with pronouns and, of course, homonyms.  Errors with words of this sort may escape editing.        Patient Instructions   1.  The lotrisone cream needs to come from Dr. Schaefer, who prescribed it.  This is a high potency steroid and I will defer to him in this regard.    2.  I have ordered home care, including having a skilled nurse see you, occupational therapy, and physical therapy.    3.  I have ordered no new medications.    4.  The cyclobenzaprine, diazepam and oxycodone will come from Dr. Verde for the near term, at least six weeks.    5.  Come see me again in 3 months unless you have problems.    6.  Have a blood count in a month.  Make that appointment as you leave today, please.     7.  Constipation is the expected outcome for oxycodone.  Please do not let your gut turn into a brick factory!     A.  Colace (generic) 100 mg twice a day to soften stools, and ...     B.  Senokot or ExLax or a generic senna laxative twice daily while on narcotics.     C.  If the senna does not do the job, let me know.  I have an ace up my sleeve.     D.  If you get diarrhea, please cut back to once daily with the senna.     E.  Ask your pharmacy staff to find the most inexpensive brand of senna.  High cost does not mean higher quality.

## 2021-06-08 NOTE — PROGRESS NOTES
"Walk-In Clinic Note    SUBJECTIVE:   Dorita Mason is a 83 y.o. female who presents today for evaluation of cough. She has noted a morning cough for the last 3-4 days.  She also has been more fatigued in this time period as well.  No fevers.  No cough throughout the rest of the day.  She denies any shortness of breath.  Her daughter notes she has been much less active and laying in bed more recently.  She did just have a spinal fusion surgery on January 13.  Since that time, she has had aching, particularly in her hips.  She otherwise feels that postoperatively she is doing well, pain is well controlled.  No shortness of breath with laying flat but does feel like she occasionally needs to take a deep breath when laying flat.  She denies any chest pain.  Her incision is healing well without any redness or drainage.  She does wear a brace when she is up and out of bed, but does not feel he does limit her breathing.  She has a history of congestive heart failure in the 90s, but states that this was \"ruled out\" by her cardiologist Dr. Parmar.  She has never had any further heart troubles.  She has had some trace lower extremity edema, wears compression hose when she is up and about.  She has had sick contacts with her grandchildren who has come to visit her at home.    Patient Active Problem List   Diagnosis     Benign paroxysmal positional vertigo     Dyslipidemia, goal LDL below 70     Coronary Artery Disease     Stage 3 CKD - decreased kidney filtering efficiency     Hypothyroidism     Essential hypertension     Osteopenia     Lichen Sclerosus Et Atrophicus     Diverticulosis     Hypovitaminosis D - less than 20 in 2009.   Normal is 30 - 100     ARMD (age related macular degeneration) - both eyes.     Floaters, right - this is very prominent on right, she says.  (? posterior vitreous detachment)     Epiretinal membrane, left eye [facility] 0- exam of 7/15/16 - no treatment recommended.     Lumbar " spinal stenosis - Surgery on 1/13/17 - Dr. Tyler from [facility]     First degree AV block - slight delay in the internal messaging system in the heart       History   Smoking Status     Former Smoker     Types: Cigarettes     Start date: 3/17/1947     Quit date: 3/17/1963   Smokeless Tobacco     Never Used       Current Medications:  Current Outpatient Prescriptions on File Prior to Visit   Medication Sig Dispense Refill     ascorbic acid (ASCORBIC ACID WITH FERNANDO HIPS) 500 MG tablet Take 500 mg by mouth daily.        aspirin 81 MG EC tablet Take 81 mg by mouth daily.       clotrimazole-betamethasone (LOTRISONE) cream Apply topically bedtime.        diclofenac sodium (VOLTAREN) 1 % Gel Apply topically 4 (four) times a day. To legs       docusate sodium (COLACE) 100 MG capsule Take 100 mg by mouth 2 (two) times a day as needed.        fluticasone (FLONASE) 50 mcg/actuation nasal spray 2 squirts, each nostril, qam (Patient taking differently: 2 sprays into each nostril daily as needed. 2 squirts, each nostril, qam) 16 g 12     furosemide (LASIX) 20 MG tablet Take 20 mg by mouth daily.       levothyroxine (SYNTHROID) 125 MCG tablet Take 1 tablet (125 mcg total) by mouth daily. 90 tablet 3     lisinopril (PRINIVIL,ZESTRIL) 10 MG tablet Take 10 mg by mouth daily.       multivitamin Chew Chew 1 tablet daily.       nitroglycerin (NITROSTAT) 0.4 MG SL tablet Place 1 tablet (0.4 mg total) under the tongue every 5 (five) minutes as needed for chest pain. 100 tablet 0     nystatin (MYCOSTATIN) cream Apply topically 2 (two) times a day.       omega-3 fatty acids-fish oil (OMEGA 3 FISH OIL) 684-1,200 mg CpDR Take 1 capsule by mouth 2 (two) times a day.        omeprazole (PRILOSEC OTC) 20 MG tablet Take 20 mg by mouth daily as needed.        simvastatin (ZOCOR) 40 MG tablet Take 40 mg by mouth bedtime.       triamcinolone (KENALOG) 0.1 % cream Apply topically 2 (two) times a day.       vitamin A-vitamin C-vit E-min (OCUVITE) Tab tablet  Take 1 tablet by mouth daily.       [] acetaminophen (TYLENOL) 500 MG tablet Take 1 tablet (500 mg total) by mouth every 4 (four) hours for 10 days. 30 tablet 0     [] cyclobenzaprine (FLEXERIL) 5 MG tablet Take 1 tablet (5 mg total) by mouth every 8 (eight) hours as needed for muscle spasms. 30 tablet 0     [] diazePAM (VALIUM) 2 MG tablet Take 0.5 tablets (1 mg total) by mouth every 6 (six) hours as needed. 20 tablet 0     [] oxyCODONE (ROXICODONE) 5 MG immediate release tablet Take 1-2 tablets (5-10 mg total) by mouth every 4 (four) hours as needed for pain. 60 tablet 0     No current facility-administered medications on file prior to visit.        Allergies:   Allergies   Allergen Reactions     Shellfish Containing Products        OBJECTIVE:   Vitals:    17 1513   BP: 120/70   Pulse: 83   Resp: 16   Temp: 98.2  F (36.8  C)   TempSrc: Oral   SpO2: 94%   Weight: 163 lb (73.9 kg)      General: Alert and in no acute distress.  Neck: Supple, no lymphadenopathy, full ROM.  Pulmonary: Clear to ausculation throughout with good air movement, no crackles or wheezing.  Cardiac: Regular rate and rhythm, no murmur  Back: midline lumbar incision well healing, no surrounding erythema or drainage  Musculoskeletal: No deformities. Ambulatory with movement of all extremities. No calf tenderness, no LE swelling.  Skin: Color is normal. No rashes or abnormal lesions.    ASSESSMENT AND PLAN:   1. Cough  XR Chest PA and Lateral     83-year-old with recent lumbar surgery on .  For the last 3 days she has had morning cough, resolved throughout the rest of the day.  Daughter notes she's also been laying around more the last 3 days.  Exam is unremarkable today, including a lung exam.  Chest x-ray was obtained with her postoperative status and no acute findings, similar in appearance to her chest x-ray done 2 years ago at the time of the fall per her daughter.  We discussed that the coughing in  the morning could be due to atelectasis versus pleural fluid secondary to more shallow breathing while sleeping.  Does not appear in it acute heart failure.  No evidence of pneumonia.  She is using her incentive surrounded her a few times a day, we discussed increasing this to immediately in the morning, prior to bed, as well as 3 additional times throughout the day.  She will work on being up and moving more frequently and taking deep breaths.  We reviewed that she certainly is high risk for pneumonia with her postoperative status.  They will monitor closely for additional findings or worsening symptoms, with follow-up at that time.  If her symptoms are not improved in one week she will follow-up at that time with her primary care provider.    Eleonora Gates MD

## 2021-06-12 NOTE — PROGRESS NOTES
[facility] Clinic Note  Patient Name: Dorita Mason  Patient Age: 84 y.o.  YOB: 1933  MRN: [id number]  ?  Date of Visit: 8/21/2017  Reason for Office Visit:   Chief Complaint   Patient presents with     Fatigue     Tired all the time     Constipation     Blood in stool      Chest Pain     Heavy pain, not sharp     Headache     In front and behind, sharp pains      Knee Pain       HPI: Dorita Mason 84 y.o. female who presents to clinic with her daughter today for a couple reasons today.     1. Constipation. Sometimes blood in stool, bright bread stool. Has a history of hemorrhoids. Takes stool softeners. BMs are irregular    2. Headaches - frequent, almost every day, located around top of head and occipital area. Chronic. Has been addressed in the past and had CT done a few years ago which showed senescent global parenchymal volume loss and mild presumed microangiopathic ischemic changes. Can be quite severe at times, with associated nausea, + photophobia. Usually wakes every morning with headache. Takes Excedrin prn which usually provides some relief for short time. Some days the headache will not resolve. Was prescribed fioricet recently but this does not help as abortive rx. No radiculopathy.     She also has macular degeneration along with her other health issues and cannot leave home without a caregiver. Her daughter is requesting any type of aide, such as nursing care to help care for her mother. Mana  has dementia and is unable to help care for her.     Review of Systems: As noted in HPI     Current Scheduled Meds:  Outpatient Encounter Prescriptions as of 8/21/2017   Medication Sig Dispense Refill     ascorbic acid (ASCORBIC ACID WITH FERNANDO HIPS) 500 MG tablet Take 500 mg by mouth daily.        aspirin 81 MG EC tablet Take 81 mg by mouth daily.       butalbital-acetaminophen-caffeine (FIORICET, ESGIC) -40 mg per tablet Take 1-2 tablets by mouth every 6 (six)  hours as needed for headaches. 20 tablet 0     clotrimazole-betamethasone (LOTRISONE) cream APPLY CREAM TOPICALLY AT BEDTIME 45 g 0     diclofenac sodium (VOLTAREN) 1 % Gel Apply topically 4 (four) times a day. To legs       docusate sodium (COLACE) 100 MG capsule Take 100 mg by mouth 2 (two) times a day as needed.        fluticasone (FLONASE) 50 mcg/actuation nasal spray 2 sprays into each nostril daily as needed. 2 squirts, each nostril, qam 16 g 11     furosemide (LASIX) 20 MG tablet Take 20 mg by mouth daily.       lisinopril (PRINIVIL,ZESTRIL) 10 MG tablet Take 10 mg by mouth daily.       melatonin 3 mg Tab tablet Take 3 mg by mouth bedtime as needed. OTC       multivitamin Chew Chew 1 tablet daily.       nitroglycerin (NITROSTAT) 0.4 MG SL tablet Place 1 tablet (0.4 mg total) under the tongue every 5 (five) minutes as needed for chest pain. 100 tablet 0     nystatin (MYCOSTATIN) cream Apply topically 2 (two) times a day.       omega-3 fatty acids-fish oil (OMEGA 3 FISH OIL) 684-1,200 mg CpDR Take 1 capsule by mouth 2 (two) times a day.        omeprazole (PRILOSEC OTC) 20 MG tablet Take 20 mg by mouth daily as needed.        triamcinolone (KENALOG) 0.1 % cream Apply topically 2 (two) times a day.       vitamin A-vitamin C-vit E-min (OCUVITE) Tab tablet Take 1 tablet by mouth daily.       hydrocortisone 25 mg suppository Insert 1 suppository (25 mg total) into the rectum every 12 (twelve) hours for 14 days. 28 suppository 0     montelukast (SINGULAIR) 10 mg tablet Take 1 tablet (10 mg total) by mouth at bedtime. 30 tablet 0     SUMAtriptan (IMITREX) 50 MG tablet Take 1 tablet (50 mg total) by mouth every 2 (two) hours as needed for migraine. 10 tablet 0     No facility-administered encounter medications on file as of 8/21/2017.        Objective / Physical Examination:  /74 (Patient Site: Right Arm, Patient Position: Sitting, Cuff Size: Adult Regular)  Pulse 76  Wt 158 lb 6.4 oz (71.8 kg)  SpO2 96%  BMI  30.94 kg/m2  Wt Readings from Last 3 Encounters:   08/21/17 158 lb 6.4 oz (71.8 kg)   05/11/17 156 lb (70.8 kg)   04/25/17 153 lb (69.4 kg)     Body mass index is 30.94 kg/(m^2). (>25?)    General Appearance: Alert and oriented in no acute distress  Head: Normocephalic, atraumatic  Ears: Tympanic membrane clear with landmarks well visualized bilaterally  Eyes: PERRL, EOMI, Conjunctivae clear   Neck: Supple, No Thyromegaly   Cardiovascular: RRR  Abdomen: Bowel sounds active all four quadrants. Soft, non-tender.   Extremities: No edema. Strength equal throughout.  Integumentary: Warm and dry  Neuro: Gait normal. grossly normal neurologic exam     Assessment / Plan / Medical Decision Making:      Encounter Diagnoses   Name Primary?     Constipation Yes     Frequent headaches      Blood in stool      HTN (hypertension)      Macular degeneration         1. Constipation  Likely functional, discussed increase H20 intake, very important  Adding a daily fiber supplement to full glass of water. If this doesn't help can go to something like miralax or milk of mag    2. Frequent headaches  Clinically sounds migrainous. Will try a triptan as abortive rx and refer to neurology at the request of the family for a second opinion and help with management.    - Thyroid Stimulating Hormone (TSH)  - Ambulatory referral to Neurology  - SUMAtriptan (IMITREX) 50 MG tablet; Take 1 tablet (50 mg total) by mouth every 2 (two) hours as needed for migraine.  Dispense: 10 tablet; Refill: 0    3. Blood in stool  Likely fissure v internal hemorrhoid. If bleeding returns would recommend a hydrocortisone supp. Good bowel regimen   Cbc checked today, hemoglobin actually improved since last check, reassuring   - HM2(CBC w/o Differential)  - hydrocortisone 25 mg suppository; Insert 1 suppository (25 mg total) into the rectum every 12 (twelve) hours for 14 days.  Dispense: 28 suppository; Refill: 0    4. HTN (hypertension)  Well controlled.     5. CKD  III  Stable, continue to monitor BP, avoid nephrotoxic agents    6. Macular degeneration    Will place a referral to home health to try and get them some sort of nursing care if they can accommodate and if she qualifies. Her daughter is trying to become her full time PCA but will take time  - Ambulatory referral to Home Health    Follow up to establish care, please make appointment.     Total time spent with patient was 25 minutes with >50% of time spent in face-to-face counseling regarding the above plan     Duc Ospina MD  [facility]

## 2021-06-12 NOTE — PROGRESS NOTES
"[facility] Clinic Note  Patient Name: Dorita Mason  Patient Age: 84 y.o.  YOB: 1933  MRN: [id number]  ?  Date of Visit: 9/7/2017  Reason for Office Visit:   Chief Complaint   Patient presents with     Toe Injury     L Big toe, has fungus on the toenail and painful underneath. had the fungus infection \"for years\" pt states       HPI: Dorita Mason 84 y.o. who presents to clinic for left great toenail pain. She has thickened yellow nails and the left great toe started to hurt last week. No pus. Has not been trimming nails recently     Review of Systems: As noted in HPI     Current Scheduled Meds:  Outpatient Encounter Prescriptions as of 9/7/2017   Medication Sig Dispense Refill     ascorbic acid (ASCORBIC ACID WITH FERNANDO HIPS) 500 MG tablet Take 500 mg by mouth daily.        aspirin 81 MG EC tablet Take 81 mg by mouth daily.       butalbital-acetaminophen-caffeine (FIORICET, ESGIC) -40 mg per tablet Take 1-2 tablets by mouth every 6 (six) hours as needed for headaches. 20 tablet 0     clotrimazole-betamethasone (LOTRISONE) cream APPLY CREAM TOPICALLY AT BEDTIME 45 g 0     diclofenac sodium (VOLTAREN) 1 % Gel Apply topically 4 (four) times a day. To legs       docusate sodium (COLACE) 100 MG capsule Take 100 mg by mouth 2 (two) times a day as needed.        fluticasone (FLONASE) 50 mcg/actuation nasal spray 2 sprays into each nostril daily as needed. 2 squirts, each nostril, qam 16 g 11     furosemide (LASIX) 20 MG tablet Take 20 mg by mouth daily.       lisinopril (PRINIVIL,ZESTRIL) 10 MG tablet Take 10 mg by mouth daily.       melatonin 3 mg Tab tablet Take 3 mg by mouth bedtime as needed. OTC       montelukast (SINGULAIR) 10 mg tablet Take 1 tablet (10 mg total) by mouth at bedtime. 30 tablet 0     multivitamin Chew Chew 1 tablet daily.       nitroglycerin (NITROSTAT) 0.4 MG SL tablet Place 1 tablet (0.4 mg total) under the tongue every 5 (five) minutes as needed for chest " pain. 100 tablet 0     nystatin (MYCOSTATIN) cream Apply topically 2 (two) times a day.       omega-3 fatty acids-fish oil (OMEGA 3 FISH OIL) 684-1,200 mg CpDR Take 1 capsule by mouth 2 (two) times a day.        omeprazole (PRILOSEC OTC) 20 MG tablet Take 20 mg by mouth daily as needed.        SUMAtriptan (IMITREX) 50 MG tablet Take 1 tablet (50 mg total) by mouth every 2 (two) hours as needed for migraine. 10 tablet 0     triamcinolone (KENALOG) 0.1 % cream Apply topically 2 (two) times a day.       vitamin A-vitamin C-vit E-min (OCUVITE) Tab tablet Take 1 tablet by mouth daily.       No facility-administered encounter medications on file as of 9/7/2017.        Objective / Physical Examination:  /84  Pulse 64  Wt 160 lb 12.8 oz (72.9 kg)  BMI 31.4 kg/m2  Wt Readings from Last 3 Encounters:   09/07/17 160 lb 12.8 oz (72.9 kg)   08/21/17 158 lb 6.4 oz (71.8 kg)   05/11/17 156 lb (70.8 kg)     Body mass index is 31.4 kg/(m^2). (>25?)    General Appearance: Alert and oriented in no acute distress  Extremities: left great toe, tender with ingrown thickened yellow nail     Assessment / Plan / Medical Decision Making:      Encounter Diagnoses   Name Primary?     Ingrown nail of great toe of left foot Yes        1. Ingrown nail of great toe of left foot    Does not appear infected  Soak qid over the weekend  Return next week for toe nail removal     Total time spent with patient was 10 minutes with >50% of time spent in face-to-face counseling regarding the above plan     Duc Ospina MD  [facility]

## 2021-06-12 NOTE — PROGRESS NOTES
[facility] Clinic Note  Patient Name: Dorita Mason  Patient Age: 84 y.o.  YOB: 1933  MRN: [id number]  ?  Date of Visit: 9/12/2017  Reason for Office Visit:   Chief Complaint   Patient presents with     Toe Pain     L Big toe infection     HPI: Dorita Mason 84 y.o. who presents to clinic for ingrown nail removal, left great toe. Onychocryptosis lateral border left great toe. Has been soaking it over the weekend. Pain lateral margin     Review of Systems: As noted in HPI     Objective / Physical Examination:  /68  Pulse 78  Wt 161 lb (73 kg)  SpO2 93%  BMI 31.44 kg/m2  Wt Readings from Last 3 Encounters:   09/12/17 161 lb (73 kg)   09/07/17 160 lb 12.8 oz (72.9 kg)   08/21/17 158 lb 6.4 oz (71.8 kg)     Body mass index is 31.44 kg/(m^2). (>25?)    General Appearance: Alert and oriented in no acute distress  Integumentary: thickened yellow great toe, tender over lateral margin, no pus or redness.   Neuro: Alert and oriented, follows commands appropriately.    Assessment / Plan / Medical Decision Making:      Encounter Diagnoses   Name Primary?     Ingrown nail of great toe of left foot Yes        1. Ingrown nail of great toe of left foot    Plan: Local anesthesia was given, consisting of 6ml of 2% lidocaine plain The left great toe wasprepped with betadine. A tourniquet was fixed at the base of the leftgreat toe to maintain local hemostasis. The offending nail border wasfreed from surrounding soft tissues and excised The site was flushed with alcohol and dressed with bacitracin, Telfa and gauze. The tourniquet was removed and good perfusion was restored to the toe. The patient was given written and verbal post-procedure instructions. Follow up here as needed.    pain control after anesthesia wears off - ultram 50 mg prn - advised about side effects.     Total time spent with patient was 25 minutes with >50% of time spent in face-to-face counseling regarding the above plan      Duc Ospina MD  [facility]

## 2021-06-15 NOTE — PROGRESS NOTES
Assessment/Plan:        1. Joint pain  Refill on   - traMADol (ULTRAM) 50 mg tablet; Take 1 tablet (50 mg total) by mouth every 6 (six) hours as needed for pain.  Dispense: 20 tablet; Refill: 0    2. Allergic rhinitis due to dog hair  Refill:   - fluticasone (FLONASE) 50 mcg/actuation nasal spray; 2 sprays into each nostril daily. 2 squirts, each nostril, qam  Dispense: 16 g; Refill: 3    3. Hypertension  Optimized     - Basic Metabolic Panel  - lisinopril (PRINIVIL,ZESTRIL) 10 MG tablet; Take 1 tablet (10 mg total) by mouth daily.  Dispense: 90 tablet; Refill: 1    4. Cough/ BARBARA    - XR Chest 2 Views  - negative chest   Exam findings were discussed   URI.   Supportive care offered.   flonase for BARBARA.         Subjective:    Patient ID:   Dorita Mason is a 84 y.o. female here to establish care,     Issues to discuss:     1. Joint pain   Chronic join pain   Chronic osteoarthritic knee pain   Managed on Tramadol   Needs a refill        2. Allergic rhinitis due to dog hair   - on flonase   Needs a refill      3. Hypertension   On lisinopril   She has not been taking it for a week since ran out.   No issues or concerns. No side effects.        4. Having an Upper respiratory infection since last Sunday, with cough, sorethroat and RN, pessure in the ears, intermittently dizzy, which got worse when she went on a plane trip.   She has been exposed to bacterial pneumonia. Not sure if been exposed to the Flu.     She's had her flu shot for the season.             allergies, current medications, past family history, past medical history, past social history, past surgical history and problem list.    Review of Systems  A complete 10 point review of systems was obtained and is negative other than what is stated in the HPI.           Objective:   /80 (Patient Site: Right Arm, Patient Position: Sitting, Cuff Size: Adult Regular)  Pulse 85  Temp 98.9  F (37.2  C) (Oral)   Wt 163 lb (73.9 kg)  SpO2 94%  BMI 31.83  kg/m2      Physical Exam  General Appearance:    Alert, cooperative, no distress,    Eyes:    PERRL, conjunctiva/corneas clear,    Ears/ Throat:   BARBARA bilaterally, Lips, mucosa, and tongue normal;  gums normal   Neck:   Supple, symmetrical, trachea midline, no adenopathy;        thyroid:  No enlargement/tenderness/nodules; no carotid    bruit or JVD   Lungs:     Clear to auscultation bilaterally, respirations unlabored   Heart:    Regular rate and rhythm, S1 and S2 normal, no murmur, rub   or gallop   Abdomen:     Soft, non-tender, normal bowel sounds, no rebound or guarding, no masses, no organomegaly   Extremities:   Extremities normal, atraumatic, no cyanosis or edema   Skin:   Skin color, texture, turgor normal, no rashes or lesions

## 2021-06-16 PROBLEM — I10 HYPERTENSION: Status: ACTIVE | Noted: 2018-01-18

## 2021-06-16 NOTE — PROGRESS NOTES
Assessment/Plan:        1. Essential hypertension / Edema  Normotensive.     Continue current management.     - Basic Metabolic Panel  EKG :   Sinus rhythm with 1st degree A-V block   Rightward axis    H/o 1st deg A-V block.     Refill:   - furosemide (LASIX) 20 MG tablet; Take 1 tablet (20 mg total) by mouth daily.  Dispense: 90 tablet; Refill: 0      2. Hypothyroidism  Recheck lab   Lab Results   Component Value Date    TSH 2.82 03/15/2018    nl range   Continue current management.   - levothyroxine (SYNTHROID, LEVOTHROID) 125 MCG tablet; Take 1 tablet (125 mcg total) by mouth Daily at 6:00 am.  Dispense: 90 tablet; Refill: 1        Will try the claritin to help with the headaches and follow up with any lingering concerns.         Subjective:    Patient ID:   Dorita Mason is a 84 y.o. female here for the following :     1. Essential hypertension /   Edema    needs a refill on Furosemide.           2. Intermittent headaches-   She is questioning if allergies and congestion in the head may be causing      3. Hypothyroidism   Recheck         She is voicing no other concerns.         allergies, current medications, past medical history and problem list.    Review of Systems  A complete 7 point review of systems was obtained and is negative other than what is stated in the HPI.             Objective:   /70 (Patient Site: Right Arm, Patient Position: Sitting, Cuff Size: Adult Regular)  Pulse 68  Temp 97.6  F (36.4  C) (Oral)   Wt 160 lb 14.4 oz (73 kg)  SpO2 98%  BMI 31.42 kg/m2      Physical Exam  General Appearance:    Alert, well hydrated, no distress   Throat:   mucous membranes moist, pharynx normal without lesions   Neck:   Supple, symmetrical, trachea midline, no adenopathy;     thyroid:  no enlargement/tenderness/nodules;    Lungs:     clear to auscultation, no wheezes, rales or rhonchi, symmetric air entry     Heart:    Regular rate and rhythm, S1 and S2 normal, no murmur, rub   or gallop, no  edema    Skin:   Skin color, texture, turgor normal, no rashes or lesions

## 2021-06-17 NOTE — PROGRESS NOTES
Family Medicine Office Visit  [facility]  Patient Name: Dorita Mason  Patient Age: 84 y.o.  YOB: 1933  MRN: [id number]    Date of Visit: 2018  Reason for Office Visit:   Chief Complaint   Patient presents with     Back Pain           Assessment / Plan / Medical Decision Makin. Back pain  - methylPREDNISolone acetate injection 40 mg (DEPO-MEDROL); Inject 1 mL (40 mg total) into the shoulder, thigh, or buttocks once.    2. Right hip pain  - take the tramadol as directed.  Start cymbalta for the numbness as well as mood and see if any improvement in symptoms.  Follow up with spine doctor and then RTC in 2 weeks.  Call if any questions or concerns.        Health Maintenance Review  Health Maintenance   Topic Date Due     ZOSTER VACCINE  1993     DXA SCAN  1998     FALL RISK ASSESSMENT  2017     INFLUENZA VACCINE RULE BASED (1) 2017     ADVANCE DIRECTIVES DISCUSSED WITH PATIENT  2018     TD 18+ HE  2025     PNEUMOCOCCAL POLYSACCHARIDE VACCINE AGE 65 AND OVER  Completed     PNEUMOCOCCAL CONJUGATE VACCINE FOR ADULTS (PCV13 OR PREVNAR)  Completed         I have discontinued Ms. Daniel Mason's divalproex. I am also having her start on DULoxetine. Additionally, I am having her maintain her aspirin, omega-3 fatty acids-fish oil, multivitamin, omeprazole, nystatin, triamcinolone, docusate sodium, diclofenac sodium, nitroglycerin, melatonin, traMADol, fluticasone, lisinopril, clotrimazole-betamethasone, levothyroxine, and furosemide. We administered methylPREDNISolone acetate.      HPI:  Dorita Mason is a 84 y.o. year old who presents to the office today for pain in the back and the right hip.  Ongoing for a long time but in the past 2 weeks gotten much worse.  Hx of back surgery with bone growth stimulator and bone spurs.  Complains of some tingling in the lower abdomen from time to time.  No change in bowel or urinary  habits. Taking tramadol infrequently for the pain.  No hx of falls.  No hx of recent injury.  Scheduled to see her regular [facility] doctor on 4/11/18.  State having lots of issues at home -  suffering from cancer and mood related issues.  Mood is low, crying all the time, not sleeping well.        Review of Systems- pertinent positive in bold:  Constitutional: Fever, chills, night sweats, fainting, weight change, fatigue, seizures, dizziness, sleeping difficulties, loud snoring/pauses in breathing  Eyes: change in vision, blurred or double vision, redness/eye pain  Ears, nose, mouth, throat: change in hearing, ear pain, hoarseness, difficulty swallowing, sores in the mouth or throat  Respiratory: shortness of breath, cough, bloody sputum, wheezing  Cardiovascular: chest pain, palpitations   Gastrointestinal: abdominal pain, heartburn/indigestion, nausea/vomiting, change in appetite, change in bowel habits, constipation or diarrhea, rectal bleeding/dark stools, difficulty swallowing  Urinary: painful urination, frequent urination, urinary urgency/incontinence, blood in urine/dark urine, nocturia  Musculoskeletal: backache/back pain (new or increasing), weakness, joint pain/stiffness (new or increasing), muscle cramps, swelling of hands, feet, ankles, leg pain/redness  Skin: change in moles/freckles, rash, nodules  Hematologic/lymphatic: swollen lymph glands, abnormal bruising/bleeding  Endocrine: excessive thirst/urination, cold or heat intolerance  Neurologic/emotional: worrisome memory change, numbness/tingling, anxiety, mood swings      Current Scheduled Meds:  Outpatient Encounter Prescriptions as of 4/2/2018   Medication Sig Dispense Refill     aspirin 81 MG EC tablet Take 81 mg by mouth daily.       clotrimazole-betamethasone (LOTRISONE) cream APPLY CREAM TOPICALLY AT BEDTIME 45 g 0     diclofenac sodium (VOLTAREN) 1 % Gel Apply topically 4 (four) times a day. To legs       docusate sodium (COLACE) 100 MG  capsule Take 100 mg by mouth 2 (two) times a day as needed.        DULoxetine (CYMBALTA) 20 MG capsule Take 1 capsule (20 mg total) by mouth daily. 30 capsule 2     fluticasone (FLONASE) 50 mcg/actuation nasal spray 2 sprays into each nostril daily. 2 squirts, each nostril, qam 16 g 3     furosemide (LASIX) 20 MG tablet Take 1 tablet (20 mg total) by mouth daily. 90 tablet 0     levothyroxine (SYNTHROID, LEVOTHROID) 125 MCG tablet Take 1 tablet (125 mcg total) by mouth Daily at 6:00 am. 90 tablet 1     lisinopril (PRINIVIL,ZESTRIL) 10 MG tablet Take 1 tablet (10 mg total) by mouth daily. 90 tablet 1     melatonin 3 mg Tab tablet Take 3 mg by mouth bedtime as needed. OTC       multivitamin Chew Chew 1 tablet daily.       nitroglycerin (NITROSTAT) 0.4 MG SL tablet Place 1 tablet (0.4 mg total) under the tongue every 5 (five) minutes as needed for chest pain. 100 tablet 0     nystatin (MYCOSTATIN) cream Apply topically 2 (two) times a day.       omega-3 fatty acids-fish oil (OMEGA 3 FISH OIL) 684-1,200 mg CpDR Take 1 capsule by mouth 2 (two) times a day.        omeprazole (PRILOSEC OTC) 20 MG tablet Take 20 mg by mouth daily as needed.        traMADol (ULTRAM) 50 mg tablet Take 1 tablet (50 mg total) by mouth every 6 (six) hours as needed for pain. 20 tablet 0     triamcinolone (KENALOG) 0.1 % cream Apply topically 2 (two) times a day.       [DISCONTINUED] divalproex (DEPAKOTE) 250 MG 24 hr tablet        Facility-Administered Encounter Medications as of 4/2/2018   Medication Dose Route Frequency Provider Last Rate Last Dose     [COMPLETED] methylPREDNISolone acetate injection 40 mg (DEPO-MEDROL)  40 mg Intramuscular Once Jaimee Ness MD   40 mg at 04/02/18 4688     Past Medical History:   Diagnosis Date     Abnormal mammogram - 2008 mammogram had two areas in left breast that were not fully defined. 12/15/2015     ARMD (age related macular degeneration) - both eyes. 12/15/2015     Chronic kidney disease      Chronic  "Kidney Disease, Stage 2      Coronary Artery Disease 2012    Parmar Harry: CT Angiogram showed 70-80% narrowing in proximal LAD      Decreased sense of vibration - the ankles don't perceive vibration with a tuning fork. 8/21/2015     Dyslipidemia, goal LDL below 70     Created by [employer] [facility] Annotation: Jul 13 2014  1:43PM - Kellie Cárdenas: on statin      Essential hypertension     Created by [employer] [facility] Annotation: May 27 2010 11:51AM - Kellie Cárdenas: controlled      Gallstone - \"Probable small gallstone\" on CT of 2/2/12 8/14/2015     Hypothyroidism     Created by [employer] [facility] Annotation: Nov 8 2007  5:50PM - Kellie Cárdenas: stable      Impaired joint position sense - the big toes don't perceive up versus down. 8/21/2015     Osteopenia     Created by [employer] [facility] Annotation: Jun 21 2009 11:06PM - Kellie Cárdenas: found on DEXA  scan done in 6/09      Tinnitus of both ears - the same in both ears - \"It's in my head.  I can't tell which ear it's coming from.\" 8/21/2015     TMJ Pain      Venous hum - left neck 8/21/2015     Past Surgical History:   Procedure Laterality Date     BLADDER SUSPENSION       HYSTERECTOMY       LUMBAR FUSION N/A 1/13/2017    Procedure: BILATERAL POSTERIOR SPINAL FUSION L3-4 AND L4-5 WITH BILATERAL LATERAL RECESS DECOMPRESSION L3-4;  Surgeon: Patel Verde MD;  Location: [facility];  Service:      LUMBAR LAMINECTOMY N/A 1/13/2017    Procedure: BILATERAL LAMINECTOMY AND DECOMPRESSION L4-5 WITH BONE MARROW ASPIRATE ;  Surgeon: Patel Verde MD;  Location: [facility] OR;  Service:      MA CMBND ANTERPOST COLPORRAPHY W/CYSTO      Description: Combined A-P Colporrhaphy (For Pelvic Relaxation;  Recorded: 12/14/2009;  Comments: 12/7/09     MA TOTAL ABDOM HYSTERECTOMY      Description: Hysterectomy;  Recorded: 01/05/2009;  Comments: in 2002 for ovarian cyst     Social History   Substance Use Topics     Smoking " status: Former Smoker     Types: Cigarettes     Start date: 3/17/1947     Quit date: 3/17/1963     Smokeless tobacco: Never Used     Alcohol use No      Comment: Never a problem for her.  Rare wine.       Objective / Physical Examination:  Vitals:    04/02/18 1803   BP: 124/64   Pulse: 77   Height: 5' (1.524 m)     Wt Readings from Last 3 Encounters:   03/15/18 160 lb 14.4 oz (73 kg)   01/18/18 163 lb (73.9 kg)   09/12/17 161 lb (73 kg)     BP Readings from Last 3 Encounters:   04/02/18 124/64   03/15/18 138/70   01/18/18 112/80     There is no height or weight on file to calculate BMI.     General Appearance: Alert and oriented, cooperative, affect appropriate, speech clear, in no apparent distress  Head: Normocephalic, atraumatic  Ears: Tympanic membrane clear with landmarks well visualized bilaterally  Eyes: PERRL, fundi appear clear bilaterally. EOMI. Conjunctivae clear and sclerae non-icteric  Nose: Septum midline, nares patent, no visible polyps, mucosa moist and without drainage  Throat: Lips and mucosa moist. Teeth in good repair, pharynx without erythema or exudate  Neck: Supple, trachea midline. No cervical adenopathy  Back: Symmetrical and nontender  Lungs: Clear to auscultation bilaterally. Normal inspiratory and expiratory effort  Cardiovascular: Regular rate, normal S1, S2. No murmurs, rubs, or gallops  Abdomen: Bowel sounds active all four quadrants. Soft, non-tender. No hepatomegaly or splenomegaly. No bruits detected.   Extremities: Pulses 2+ and equal throughout. No edema. Strength equal throughout.  Integumentary: Warm and dry. Without suspicious looking lesions  Neuro: Alert and oriented, follows commands appropriately.     No orders of the defined types were placed in this encounter.  Followup: Return in about 2 weeks (around 4/16/2018). earlier if needed.    Total time spent with patient was 15 min with >50% of time spent in face-to-face counseling regarding the above plan       Jaimee Ness  MD

## 2021-06-19 NOTE — PROGRESS NOTES
Assessment and Plan:     1. Encounter for general adult medical examination with abnormal findings    2. Chest pain  Intermittent pain in the last 2 weeks, radiating to the left arm.   No chest pain today.   Plan:   - Ambulatory referral to [facility]      3. Pain of left lower leg  Joint pain  Chronic   Needs Refill   - traMADol (ULTRAM) 50 mg tablet; Take 1 tablet (50 mg total) by mouth every 6 (six) hours as needed for pain.  Dispense: 20 tablet; Refill: 0      4. Dyslipidemia, goal LDL below 70  - Lipid Profile- elevated   Start:   - atorvastatin (LIPITOR) 40 MG tablet; Take 1 tablet (40 mg total) by mouth daily.  Dispense: 90 tablet; Refill: 0   Recheck lab in 6-8 weeks     Continue  omega-3 fatty acids/fish oil 684-1,200 mg 1 capsule BID       5. Edema/ Hypertension  Elevated BP today   - Comprehensive Metabolic Panel    Continue current management.   lisinopril 10 mg Oral DAILY   - furosemide (LASIX) 20 MG tablet; Take 1 tablet (20 mg total) by mouth daily.  Dispense: 90 tablet; Refill: 0      6. Hypothyroidism  Normal - Thyroid Stimulating Hormone (TSH)  Continue current management.   levothyroxine sodium 125 mcg Oral Daily    7. H/o CKD  - Comprehensive Metabolic Panel  Stable Creatinine 0.92          The patient's current medical problems were reviewed.         The following health maintenance schedule was reviewed with the patient and provided in printed form in the after visit summary:     Health Maintenance   Topic Date Due     DEPRESSION FOLLOW UP  05/02/1933     ZOSTER VACCINE  05/02/1993     DXA SCAN  05/02/1998     FALL RISK ASSESSMENT  04/04/2017     ADVANCE DIRECTIVES DISCUSSED WITH PATIENT  05/07/2018     INFLUENZA VACCINE RULE BASED (1) 08/01/2018     TD 18+ HE  04/27/2025     PNEUMOCOCCAL POLYSACCHARIDE VACCINE AGE 65 AND OVER  Completed     PNEUMOCOCCAL CONJUGATE VACCINE FOR ADULTS (PCV13 OR PREVNAR)  Completed        Subjective:   Chief Complaint: Violet NEHEMIAS Daniel Mason is an 85 y.o.  female here for an Annual Wellness visit.     Other concerns to discuss :     Med check and review     2. Chest pain   Intermittent pain in the last 2 weeks, radiating to the left arm.   No chest pain today.     H/o CAD, HTN, and dyslipidemia      3. Pain of left lower leg   Chronic   Needs refill on her pain medication      4. Hypothyroidism   Needs lab check      5. Edema / Hypertension   on lasix and lisinopril  Needs a refill on lasix             Review of Systems  A complete 10 point review of systems was obtained and is negative other than what is stated in the HPI.      Patient Care Team:  Moe Estevez MD as PCP - General (Family Medicine)     Patient Active Problem List   Diagnosis     Benign paroxysmal positional vertigo     Dyslipidemia, goal LDL below 70     Coronary Artery Disease     Stage 3 CKD - decreased kidney filtering efficiency     Hypothyroidism     Essential hypertension     Osteopenia     Lichen Sclerosus Et Atrophicus     Diverticulosis     Hypovitaminosis D - less than 20 in 2009.   Normal is 30 - 100     ARMD (age related macular degeneration) - both eyes.     Floaters, right - this is very prominent on right, she says.  (? posterior vitreous detachment)     Epiretinal membrane, left eye [facility] 0- exam of 7/15/16 - no treatment recommended.     Lumbar spinal stenosis - Surgery on 1/13/17 - Dr. Tyler from [facility]     First degree AV block - slight delay in the internal messaging system in the heart     Hypertension     Past Medical History:   Diagnosis Date     Abnormal mammogram - 2008 mammogram had two areas in left breast that were not fully defined. 12/15/2015     ARMD (age related macular degeneration) - both eyes. 12/15/2015     Chronic kidney disease      Chronic Kidney Disease, Stage 2      Coronary Artery Disease 2012    Harry Parmar: CT Angiogram showed 70-80% narrowing in proximal LAD      Decreased sense of vibration - the ankles don't perceive  "vibration with a tuning fork. 8/21/2015     Dyslipidemia, goal LDL below 70     Created by [employer] Annotation: Jul 13 2014  1:43PM - Kellie Cárdenas: on statin      Essential hypertension     Created by [employer] Annotation: May 27 2010 11:51AM - Kellie Cárdenas: controlled      Gallstone - \"Probable small gallstone\" on CT of 2/2/12 8/14/2015     Hypothyroidism     Created by [facility] Annotation: Nov 8 2007  5:50PM - Kellie Cárdenas: stable      Impaired joint position sense - the big toes don't perceive up versus down. 8/21/2015     Osteopenia     Created by [employer] Annotation: Jun 21 2009 11:06PM - Kellie Cárdenas: found on DEXA  scan done in 6/09      Tinnitus of both ears - the same in both ears - \"It's in my head.  I can't tell which ear it's coming from.\" 8/21/2015     TMJ Pain      Venous hum - left neck 8/21/2015      Past Surgical History:   Procedure Laterality Date     BLADDER SUSPENSION       HYSTERECTOMY       LUMBAR FUSION N/A 1/13/2017    Procedure: BILATERAL POSTERIOR SPINAL FUSION L3-4 AND L4-5 WITH BILATERAL LATERAL RECESS DECOMPRESSION L3-4;  Surgeon: Patel Verde MD;  Location: [facility] OR;  Service:      LUMBAR LAMINECTOMY N/A 1/13/2017    Procedure: BILATERAL LAMINECTOMY AND DECOMPRESSION L4-5 WITH BONE MARROW ASPIRATE ;  Surgeon: Patel Verde MD;  Location: [facility] Main OR;  Service:      MT CMBND ANTERPOST COLPORRAPHY W/CYSTO      Description: Combined A-P Colporrhaphy (For Pelvic Relaxation;  Recorded: 12/14/2009;  Comments: 12/7/09     MT TOTAL ABDOM HYSTERECTOMY      Description: Hysterectomy;  Recorded: 01/05/2009;  Comments: in 2002 for ovarian cyst      Family History   Problem Relation Age of Onset     Pacemaker Father      Stroke Father      Asthma Sister      COPD Sister      Pulmonary embolism Sister      Kidney cancer Brother      nephrectomy - ESRD     Other Daughter 2     Hit by car at " age 22 months in 1968     Obesity Son      Coronary Stenting Son      Coronary Stenting Son      Coronary Stenting Son      Coronary Stenting Son      Other Daughter      Hit by her father's car, as a young child.     Coronary Stenting Brother      Lymphoma Sister       Social History     Social History     Marital status:      Spouse name: N/A     Number of children: 17     Years of education: N/A     Occupational History     Not on file.     Social History Main Topics     Smoking status: Former Smoker     Types: Cigarettes     Start date: 3/17/1947     Quit date: 3/17/1963     Smokeless tobacco: Never Used     Alcohol use No      Comment: Never a problem for her.  Rare wine.     Drug use: No     Sexual activity: Not on file     Other Topics Concern     Not on file     Social History Narrative      Current Outpatient Prescriptions   Medication Sig Dispense Refill     aspirin 81 MG EC tablet Take 81 mg by mouth daily.       clotrimazole-betamethasone (LOTRISONE) cream APPLY CREAM TOPICALLY AT BEDTIME 45 g 0     docusate sodium (COLACE) 100 MG capsule Take 100 mg by mouth 2 (two) times a day as needed.        DULoxetine (CYMBALTA) 20 MG capsule Take 1 capsule (20 mg total) by mouth daily. 30 capsule 2     fluticasone (FLONASE) 50 mcg/actuation nasal spray 2 sprays into each nostril daily. 2 squirts, each nostril, qam 16 g 3     levothyroxine (SYNTHROID, LEVOTHROID) 125 MCG tablet Take 1 tablet (125 mcg total) by mouth Daily at 6:00 am. 90 tablet 1     lisinopril (PRINIVIL,ZESTRIL) 10 MG tablet Take 1 tablet (10 mg total) by mouth daily. 90 tablet 1     melatonin 3 mg Tab tablet Take 3 mg by mouth bedtime as needed. OTC       multivitamin Chew Chew 1 tablet daily.       nitroglycerin (NITROSTAT) 0.4 MG SL tablet Place 1 tablet (0.4 mg total) under the tongue every 5 (five) minutes as needed for chest pain. 100 tablet 0     nystatin (MYCOSTATIN) cream Apply topically 2 (two) times a day.       omega-3 fatty  acids-fish oil (OMEGA 3 FISH OIL) 684-1,200 mg CpDR Take 1 capsule by mouth 2 (two) times a day.        omeprazole (PRILOSEC OTC) 20 MG tablet Take 20 mg by mouth daily as needed.        diclofenac sodium (VOLTAREN) 1 % Gel Apply topically 4 (four) times a day. To legs       furosemide (LASIX) 20 MG tablet Take 1 tablet (20 mg total) by mouth daily. 90 tablet 0     traMADol (ULTRAM) 50 mg tablet Take 1 tablet (50 mg total) by mouth every 6 (six) hours as needed for pain. 20 tablet 0     triamcinolone (KENALOG) 0.1 % cream Apply topically 2 (two) times a day.       No current facility-administered medications for this visit.       Objective:   Vital Signs:   Visit Vitals     BP (!) 144/96 (Patient Site: Right Arm, Patient Position: Sitting, Cuff Size: Adult Regular)     Pulse 86     Wt 166 lb (75.3 kg)     SpO2 95%     BMI 32.42 kg/m2        VisionScreening:  No exam data present     PHYSICAL EXAM  General :  no distress and well hydrated   Head: Normocephalic, without obvious abnormality, atraumatic  Eyes: conjunctivae/corneas clear. PERRL, EOM's intact.   ENT: normal TM and canals bilaterally, no rhinorrhea, nl pharynx, palate, tongue, oral mucosa and gums.   Neck: no adenopathy, no carotid bruit, no JVD, supple, symmetrical, trachea midline and thyroid not enlarged, symmetric, no tenderness/mass/nodules  Back: symmetric, no curvature. ROM normal. No CVA tenderness.  Lungs: clear to auscultation bilaterally.   Heart: regular rate and rhythm, S1, S2 normal, no murmur, click, rub or gallop  Abdomen: soft, non-tender; bowel sounds normal; no masses,  no organomegaly  Extremities: extremities normal, atraumatic, no cyanosis or edema  Pulses: 2+ and symmetric  Skin: Skin color, texture, turgor normal. No rashes or lesions  Neurologic: Grossly intact,  normal strength and tone. Normal symmetric reflexes. Normal coordination and gait  Psych: Alert and oriented X3, normal mood and affect.      Assessment Results 8/20/2018    Activities of Daily Living No help needed   Instrumental Activities of Daily Living 5-6 - Severe functional impairment   Mini Cog Total Score 3   Some recent data might be hidden     A Mini-Cog score of 0-2 suggests the possibility of dementia, score of 3-5 suggests no dementia    Identified Health Risks:     The patient was provided with suggestions to help her develop a healthy lifestyle.   She is at risk for lack of exercise and has been provided with information to increase physical activity for the benefit of her well-being.  The patient reports that she has difficulty with instrumental activities of daily living.  She was provided with a list of local organizations that provide support services and advised to make a follow up appointment   The patient was provided with written information regarding signs of hearing loss.

## 2021-06-25 NOTE — PROGRESS NOTES
Progress Notes by Bobby Kwon at 5/11/2017 10:00 AM     Author: Bobby Kwon Service: -- Author Type: Nurse Practitioner    Filed: 5/18/2017 11:17 PM Encounter Date: 5/11/2017 Status: Signed    : [facility] Kwon [facility]    Date of Service: 5/11/2017  Primary Provider: Bobby Montana MD    Patient Care Team:  Bobby Montana MD as PCP - General (Internal Medicine)     ______________________________________________________________________     Patient's Pharmacy:    [facility] [address] [place] [id number]  Phone: [phone] Fax: [phone]     Patient's Insurance:    Payor: BLUE CROSS / Plan: BLUE CROSS PLATINUM / Product Type: COST PLAN /     ______________________________________________________________________    Assessment:    1. Chronic headaches - migraine vs caffeine withdrawal symptoms   2. Essential hypertension with goal blood pressure less than 140/90 - recent elevated BP likely due to increased stress of 's change in health; stable in clinic today      ______________________________________________________________________      Plan:  Patient Instructions   1. Trial sumatriptan 50 mg - Take 1 tablet (50 mg total) by mouth every 2 (two) hours as needed for migraine  2. Discontinue if symptoms of chest pain or visual changes.      ______________________________________________________________________     Dorita Mason is 84 y.o. female who comes in today for:    Chief Complaint   Patient presents with   ? Headache     Has been having more headaches more than usual. Usually gets headaches weekly, but the recently they have been worse. Has recently stopped drinking caffine with in the last week.    ? Hypertension     BP has been higher. 149/82, 147/81, 139/77.        Patient Active Problem List   Diagnosis   ? Benign paroxysmal positional vertigo   ?  Dyslipidemia, goal LDL below 70   ? Coronary Artery Disease   ? Stage 3 CKD - decreased kidney filtering efficiency   ? Hypothyroidism   ? Essential hypertension   ? Osteopenia   ? Lichen Sclerosus Et Atrophicus   ? Diverticulosis   ? Hypovitaminosis D - less than 20 in 2009.   Normal is 30 - 100   ? ARMD (age related macular degeneration) - both eyes.   ? Floaters, right - this is very prominent on right, she says.  (? posterior vitreous detachment)   ? Epiretinal membrane, left eye [facility] 0- exam of 7/15/16 - no treatment recommended.   ? Lumbar spinal stenosis - Surgery on 1/13/17 - Dr. Tyler from [facility]   ? First degree AV block - slight delay in the internal messaging system in the heart     Current Outpatient Prescriptions   Medication Sig   ? ascorbic acid (ASCORBIC ACID WITH FERNANDO HIPS) 500 MG tablet Take 500 mg by mouth daily.    ? aspirin 81 MG EC tablet Take 81 mg by mouth daily.   ? diclofenac sodium (VOLTAREN) 1 % Gel Apply topically 4 (four) times a day. To legs   ? docusate sodium (COLACE) 100 MG capsule Take 100 mg by mouth 2 (two) times a day as needed.    ? fluticasone (FLONASE) 50 mcg/actuation nasal spray 2 sprays into each nostril daily as needed. 2 squirts, each nostril, qam   ? furosemide (LASIX) 20 MG tablet Take 20 mg by mouth daily.   ? levothyroxine (SYNTHROID) 125 MCG tablet Take 1 tablet (125 mcg total) by mouth daily.   ? lisinopril (PRINIVIL,ZESTRIL) 10 MG tablet Take 10 mg by mouth daily.   ? melatonin 3 mg Tab tablet Take 3 mg by mouth bedtime as needed. OTC   ? montelukast (SINGULAIR) 10 mg tablet Take 1 tablet (10 mg total) by mouth at bedtime.   ? multivitamin Chew Chew 1 tablet daily.   ? nitroglycerin (NITROSTAT) 0.4 MG SL tablet Place 1 tablet (0.4 mg total) under the tongue every 5 (five) minutes as needed for chest pain.   ? nystatin (MYCOSTATIN) cream Apply topically 2 (two) times a day.   ? omega-3 fatty acids-fish oil (OMEGA 3 FISH OIL) 684-1,200 mg CpDR  Take 1 capsule by mouth 2 (two) times a day.    ? omeprazole (PRILOSEC OTC) 20 MG tablet Take 20 mg by mouth daily as needed.    ? triamcinolone (KENALOG) 0.1 % cream Apply topically 2 (two) times a day.   ? vitamin A-vitamin C-vit E-min (OCUVITE) Tab tablet Take 1 tablet by mouth daily.   ? clotrimazole-betamethasone (LOTRISONE) cream APPLY CREAM TOPICALLY AT BEDTIME   ? SUMAtriptan (IMITREX) 50 MG tablet Take 1 tablet (50 mg total) by mouth every 2 (two) hours as needed for migraine.     Social History     Social History   ? Marital status:      Spouse name: N/A   ? Number of children: 17   ? Years of education: N/A     Occupational History   ? Not on file.     Social History Main Topics   ? Smoking status: Former Smoker     Types: Cigarettes     Start date: 3/17/1947     Quit date: 3/17/1963   ? Smokeless tobacco: Never Used   ? Alcohol use No      Comment: Never a problem for her.  Rare wine.   ? Drug use: No   ? Sexual activity: Not on file     Other Topics Concern   ? Not on file     Social History Narrative       ______________________________________________________________________     History of present illness: Dorita Mason is a pleasant 84 y.o. female who presents in clinic today, accompanied by her daughter, for evaluation of her increased headaches and elevated blood pressures.  Her  was recently hospitalized for leaking Aortic Aneurysm and his prognosis is guarded.  He is recovering at home.  She has stopped caffeine intake as her  had to do this for BP control, too.  She is under stress and has been more emotional about the current situation with her .  She has a history of headaches, never diagnosed as migraines, but recurring pretty much every day as she wakes up.  She is going to get a cortisone injection in her SI joint and right knee at [facility] [place] [facility] this next week.  The montelukast that I had started with her at the last visit has helped as she is no  longer waking up with rhinitis or dry mouth.  Her headaches are frontal and temporal primarily, but gets headaches also in the base of the back of her head as well with a neck tightness.  She has tried Excedrin Migraine without much benefit.  Lastly, she has some concerns of memory changes, but no overt behavioral changes.      Review of systems:   On ROS, the patient denies chest pain   Positive for lightheadedness, dizziness, tinnitus, anxiety, stress, tiredness, mild shortness of breath at times and symptoms as noted in the HPI.    ______________________________________________________________________    Wt Readings from Last 3 Encounters:   05/11/17 156 lb (70.8 kg)   04/25/17 153 lb (69.4 kg)   02/23/17 152 lb (68.9 kg)     BP Readings from Last 3 Encounters:   05/11/17 102/70   04/25/17 140/82   02/23/17 106/60     /70  Pulse 74  Ht 5' (1.524 m)  Wt 156 lb (70.8 kg)  BMI 30.47 kg/m2     Physical Exam:  General Appearance: Alert, cooperative, no distress, appears stated age  Lungs: Clear to auscultation bilaterally, respirations unlabored  Heart: Regular rate and rhythm, S1 and S2 normal, no murmur, rub, or gallop  Neurologic: She is alert & oriented x 3.  Psychiatric: She has a normal mood and affect.       Bobby Kwon, [facility]  Internal Medicine  [facility]     Return if symptoms worsen or fail to improve, for as needed.

## 2021-06-25 NOTE — PROGRESS NOTES
Progress Notes by Bobby Kwon at 4/25/2017  3:20 PM     Author: Bobby Kwon Service: -- Author Type: Nurse Practitioner    Filed: 4/27/2017 12:02 AM Encounter Date: 4/25/2017 Status: Signed    : [facility]    Date of Service: 4/25/2017  Primary Provider: Bobby Montana MD    Patient Care Team:  Bobby Montana MD as PCP - General (Internal Medicine)     ______________________________________________________________________     Patient's Pharmacy:    [facility] [address] [place] [address]  Phone: [phone] Fax: [phone]     Patient's Insurance:    Payor: BLUE CROSS / Plan: BLUE CROSS PLATINUM / Product Type: COST PLAN /     ______________________________________________________________________     Assessment:    1. Mild nasal congestion    2. Sneezing with watery eyes    3. Allergic rhinitis       ______________________________________________________________________       Plan:  Patient Instructions   1. Trial montelukast 10 mg by mouth at bedtime  2. May try OTC antihistamine Zyrtec 10 mg by mouth daily OR Claritin 10 mg by mouth daily  3. Continue your fluticasone nasal spray 1 spray each nostril daily.  4. Encouraged to increase water/fluid intake       ______________________________________________________________________     Dorita NEHEMIAS Mason is 83 y.o. female who comes in today for:  Chief Complaint   Patient presents with   ? Nasal Congestion     Nasal congestion and watery eyes. has 2 dogs and cats. Having issues with breathing and asthma runs in the family. Has a little hack.       ______________________________________________________________________     Patient Active Problem List   Diagnosis   ? Benign paroxysmal positional vertigo   ? Dyslipidemia, goal LDL below 70   ? Coronary Artery Disease   ? Stage 3 CKD - decreased kidney filtering  efficiency   ? Hypothyroidism   ? Essential hypertension   ? Osteopenia   ? Lichen Sclerosus Et Atrophicus   ? Diverticulosis   ? Hypovitaminosis D - less than 20 in 2009.   Normal is 30 - 100   ? ARMD (age related macular degeneration) - both eyes.   ? Floaters, right - this is very prominent on right, she says.  (? posterior vitreous detachment)   ? Epiretinal membrane, left eye [facility] 0- exam of 7/15/16 - no treatment recommended.   ? Lumbar spinal stenosis - Surgery on 1/13/17 - Dr. Tyler from [facility]   ? First degree AV block - slight delay in the internal messaging system in the heart      Current Outpatient Prescriptions   Medication Sig   ? ascorbic acid (ASCORBIC ACID WITH FERNANDO HIPS) 500 MG tablet Take 500 mg by mouth daily.    ? aspirin 81 MG EC tablet Take 81 mg by mouth daily.   ? clotrimazole-betamethasone (LOTRISONE) cream Apply topically bedtime.   ? diclofenac sodium (VOLTAREN) 1 % Gel Apply topically 4 (four) times a day. To legs   ? docusate sodium (COLACE) 100 MG capsule Take 100 mg by mouth 2 (two) times a day as needed.    ? fluticasone (FLONASE) 50 mcg/actuation nasal spray 2 sprays into each nostril daily as needed. 2 squirts, each nostril, qam   ? furosemide (LASIX) 20 MG tablet Take 20 mg by mouth daily.   ? levothyroxine (SYNTHROID) 125 MCG tablet Take 1 tablet (125 mcg total) by mouth daily.   ? lisinopril (PRINIVIL,ZESTRIL) 10 MG tablet Take 10 mg by mouth daily.   ? melatonin 3 mg Tab tablet Take 3 mg by mouth bedtime as needed. OTC   ? multivitamin Chew Chew 1 tablet daily.   ? nitroglycerin (NITROSTAT) 0.4 MG SL tablet Place 1 tablet (0.4 mg total) under the tongue every 5 (five) minutes as needed for chest pain.   ? nystatin (MYCOSTATIN) cream Apply topically 2 (two) times a day.   ? omega-3 fatty acids-fish oil (OMEGA 3 FISH OIL) 684-1,200 mg CpDR Take 1 capsule by mouth 2 (two) times a day.    ? omeprazole (PRILOSEC OTC) 20 MG tablet Take 20 mg by mouth daily as  needed.    ? triamcinolone (KENALOG) 0.1 % cream Apply topically 2 (two) times a day.   ? vitamin A-vitamin C-vit E-min (OCUVITE) Tab tablet Take 1 tablet by mouth daily.   ? montelukast (SINGULAIR) 10 mg tablet Take 1 tablet (10 mg total) by mouth at bedtime.      ______________________________________________________________________     History of present illness: Dorita Mason is a pleasant 83 y.o. female who presents in clinic today for evaluation of allergy symptoms.  She states that she has had increasing rhinitis, nasal congestion, watery eyes, dry cough over the last week or so.  She has 2 dogs and cats at home, but denies allergic symptoms with them.  We discussed that these symptoms commonly occur in the springtime and also in the fall as a result of excessive pollen, mold, ragweed, dust in the air during these times in the [place].  We have also discussed common treatments available OTC and also prescribed for these symptoms.      Review of systems:   On ROS, the patient denies eye pruritus, visual changes, headache, shortness of breath or chest pains.  Positive for symptoms as noted in the HPI.  ______________________________________________________________________     /82  Pulse 80  Ht 5' (1.524 m)  Wt 153 lb (69.4 kg)  BMI 29.88 kg/m2    Exam:  Patient is comfortable, no apparent distress.  Mood good.  Insight good.  Eyes - frequently watery, occasional tear, mild sclerae/conjunctivae injection bilaterally.  Ears - mild TM effusion without erythema on left side, right TM and canal are normal.  Nose exam shows mild rhinitis.  Throat - no erythema, exudate, or thrush.  Neck is supple, there is no cervical adenopathy.  No thyromegaly. Heart regular rate and rhythm.  Lungs are clear to auscultation bilaterally.  Respiratory effort is good.       Bobby Kwon [facility]  Internal Medicine  [facility]    Return if symptoms worsen or fail to improve.
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
## 2021-07-21 ENCOUNTER — RECORDS - HEALTHEAST (OUTPATIENT)
Dept: ADMINISTRATIVE | Facility: CLINIC | Age: 86
End: 2021-07-21

## 2021-08-22 ENCOUNTER — HEALTH MAINTENANCE LETTER (OUTPATIENT)
Age: 86
End: 2021-08-22

## 2021-10-17 ENCOUNTER — HEALTH MAINTENANCE LETTER (OUTPATIENT)
Age: 86
End: 2021-10-17

## 2022-10-02 ENCOUNTER — HEALTH MAINTENANCE LETTER (OUTPATIENT)
Age: 87
End: 2022-10-02

## 2023-02-17 NOTE — TELEPHONE ENCOUNTER
[facility] requesting refill of Lisinopril    Last OV: 8/20/18    Last refill: 10/26/18      1. Encounter for general adult medical examination with abnormal findings     2. Chest pain  Intermittent pain in the last 2 weeks, radiating to the left arm.   No chest pain today.   Plan:   - Ambulatory referral [facility]        3. Pain of left lower leg  Joint pain  Chronic   Needs Refill   - traMADol (ULTRAM) 50 mg tablet; Take 1 tablet (50 mg total) by mouth every 6 (six) hours as needed for pain.  Dispense: 20 tablet; Refill: 0        4. Dyslipidemia, goal LDL below 70  - Lipid Profile- elevated   Start:   - atorvastatin (LIPITOR) 40 MG tablet; Take 1 tablet (40 mg total) by mouth daily.  Dispense: 90 tablet; Refill: 0   Recheck lab in 6-8 weeks      Continue  omega-3 fatty acids/fish oil 684-1,200 mg 1 capsule BID         5. Edema/ Hypertension  Elevated BP today   - Comprehensive Metabolic Panel     Continue current management.   lisinopril 10 mg Oral DAILY   - furosemide (LASIX) 20 MG tablet; Take 1 tablet (20 mg total) by mouth daily.  Dispense: 90 tablet; Refill: 0        6. Hypothyroidism  Normal - Thyroid Stimulating Hormone (TSH)  Continue current management.   levothyroxine sodium 125 mcg Oral Daily     7. H/o CKD  - Comprehensive Metabolic Panel  Stable Creatinine 0.92          82yo F with PMH s/p angiography at [facility] 1 week ago, CAD s/p 1 stent, chronic back pain 2/2 to fall, anxiety, HTN, HLD, presents [facility] ED with syncope. Patient reports that she passed out while playing a board game with friends. Patient is AAOx3. History was taken with assistance from son Cruz via patient's phone. Patient reports that she felt very warm and flushed before she laid her head down on the table. Friends witnessed the episode and called 911 for ambulance to bring her to the hospital. Patient also c/o abdominal discomfort that she describes as heartburn. Patient denies abdominal pain but admits to discomfort. Patient denies history of seizures, hitting her head.     In the ED:   Vitals: T 97F, HR 71, /72, RR 18, SpO2 100 on RA  Labs: WBC 17.41 BUN 27, AST 45  CT head: No acute intracranial hemorrhage mass effect, edema or midline shift.  CT A/P NC: Questionable enteritis. No bowel obstruction. S/p appendectomy. Mild inflammation surrounding small bowel. A 1.2 cm left adrenal adenoma. Small right renal cyst.  EKG: NSR 67  CXR: official read pending   Received in the ED Cipro x1, Flagyl x1, NS 1L bolus x1, Zofran x1 -------------------------------NOTE IN PROGRESS----------------------------  CHARTING IN PROGRESS  82yo F with PMH s/p angiography at [facility] 1 week ago, CAD s/p 1 stent, chronic back pain 2/2 to fall, anxiety, HTN, HLD, presents to [facility] ED with syncope. Patient reports that she passed out while playing a board game with friends. Patient is AAOx3. History was taken with assistance from son Cruz via patient's phone. Patient reports that she felt very warm and flushed before she laid her head down on the table. Friends witnessed the episode and called 911 for ambulance to bring her to the hospital. Patient also c/o abdominal discomfort that she describes as heartburn. Patient denies abdominal pain but admits to discomfort. Patient denies history of seizures, hitting her head.     In the ED:   Vitals: T 97F, HR 71, /72, RR 18, SpO2 100 on RA  Labs: WBC 17.41 BUN 27, AST 45  CT head: No acute intracranial hemorrhage mass effect, edema or midline shift.  CT A/P NC: Questionable enteritis. No bowel obstruction. S/p appendectomy. Mild inflammation surrounding small bowel. A 1.2 cm left adrenal adenoma. Small right renal cyst.  EKG: NSR 67  CXR: official read pending   Received in the ED Cipro x1, Flagyl x1, NS 1L bolus x1, Zofran x1 84yo F with PMH s/p angiography at [facility] 1 week ago, CAD s/p 1 stent, chronic back pain 2/2 to fall, anxiety, HTN, HLD, presents to [facility] ED with syncope. Patient reports that she passed out while playing a board game with friends. Patient is AAOx3. History was taken with assistance from son Cruz via patient's phone. Patient reports that she felt very warm and flushed before she laid her head down on the table. Friends witnessed the episode which lasted for approximately 5 minutes. Friends called 911 for ambulance to bring her to the hospital. Patient also c/o abdominal discomfort that she describes as heartburn. Patient denies abdominal pain but admits to discomfort. Patient denies history of seizures, hitting her head.     In the ED:   Vitals: T 97F, HR 71, /72, RR 18, SpO2 100 on RA  Labs: WBC 17.41 BUN 27, AST 45  CT head: No acute intracranial hemorrhage mass effect, edema or midline shift.  CT A/P NC: Questionable enteritis. No bowel obstruction. S/p appendectomy. Mild inflammation surrounding small bowel. A 1.2 cm left adrenal adenoma. Small right renal cyst.  EKG: NSR 67  CXR: official read pending   Received in the ED Cipro x1, Flagyl x1, NS 1L bolus x1, Zofran x1

## 2023-10-21 ENCOUNTER — HEALTH MAINTENANCE LETTER (OUTPATIENT)
Age: 88
End: 2023-10-21

## 2024-07-17 ENCOUNTER — APPOINTMENT (RX ONLY)
Dept: URBAN - NONMETROPOLITAN AREA CLINIC 3 | Facility: CLINIC | Age: 89
Setting detail: DERMATOLOGY
End: 2024-07-17

## 2024-07-17 DIAGNOSIS — L30.4 ERYTHEMA INTERTRIGO: ICD-10-CM | Status: STABLE

## 2024-07-17 DIAGNOSIS — L90.0 LICHEN SCLEROSUS ET ATROPHICUS: ICD-10-CM | Status: INADEQUATELY CONTROLLED

## 2024-07-17 PROCEDURE — ? ADDITIONAL NOTES

## 2024-07-17 PROCEDURE — ? COUNSELING

## 2024-07-17 PROCEDURE — ? PRESCRIPTION

## 2024-07-17 PROCEDURE — ? MEDICATION COUNSELING

## 2024-07-17 PROCEDURE — 99204 OFFICE O/P NEW MOD 45 MIN: CPT

## 2024-07-17 RX ORDER — CLOBETASOL PROPIONATE 0.5 MG/G
AAA OINTMENT TOPICAL
Qty: 60 | Refills: 1 | COMMUNITY
Start: 2024-07-17

## 2024-07-17 RX ADMIN — CLOBETASOL PROPIONATE AAA: 0.5 OINTMENT TOPICAL at 00:00

## 2024-07-17 ASSESSMENT — LOCATION DETAILED DESCRIPTION DERM
LOCATION DETAILED: RIGHT ANTERIOR PROXIMAL THIGH
LOCATION DETAILED: LEFT ANTERIOR PROXIMAL THIGH
LOCATION DETAILED: RIGHT LABIUM MAJUS

## 2024-07-17 ASSESSMENT — LOCATION ZONE DERM
LOCATION ZONE: LEG
LOCATION ZONE: VULVA

## 2024-07-17 ASSESSMENT — LOCATION SIMPLE DESCRIPTION DERM
LOCATION SIMPLE: RIGHT THIGH
LOCATION SIMPLE: LABIA MAJORA
LOCATION SIMPLE: LEFT THIGH

## 2024-10-10 ENCOUNTER — APPOINTMENT (RX ONLY)
Dept: URBAN - NONMETROPOLITAN AREA CLINIC 3 | Facility: CLINIC | Age: 89
Setting detail: DERMATOLOGY
End: 2024-10-10

## 2024-10-10 DIAGNOSIS — L90.0 LICHEN SCLEROSUS ET ATROPHICUS: ICD-10-CM

## 2024-10-10 DIAGNOSIS — L57.0 ACTINIC KERATOSIS: ICD-10-CM

## 2024-10-10 DIAGNOSIS — L57.8 OTHER SKIN CHANGES DUE TO CHRONIC EXPOSURE TO NONIONIZING RADIATION: ICD-10-CM

## 2024-10-10 DIAGNOSIS — L30.4 ERYTHEMA INTERTRIGO: ICD-10-CM

## 2024-10-10 PROCEDURE — ? MEDICATION COUNSELING

## 2024-10-10 PROCEDURE — ? COUNSELING

## 2024-10-10 PROCEDURE — 99214 OFFICE O/P EST MOD 30 MIN: CPT

## 2024-10-10 PROCEDURE — ? PRESCRIPTION

## 2024-10-10 PROCEDURE — ? DEFER

## 2024-10-10 RX ORDER — CLOBETASOL PROPIONATE 0.5 MG/G
AAA OINTMENT TOPICAL
Qty: 60 | Refills: 1

## 2024-10-10 ASSESSMENT — LOCATION SIMPLE DESCRIPTION DERM
LOCATION SIMPLE: RIGHT THIGH
LOCATION SIMPLE: LEFT CHEEK
LOCATION SIMPLE: LEFT THIGH
LOCATION SIMPLE: LABIA MAJORA
LOCATION SIMPLE: RIGHT CHEEK

## 2024-10-10 ASSESSMENT — LOCATION DETAILED DESCRIPTION DERM
LOCATION DETAILED: RIGHT ANTERIOR PROXIMAL THIGH
LOCATION DETAILED: LEFT CENTRAL MALAR CHEEK
LOCATION DETAILED: RIGHT LABIUM MAJUS
LOCATION DETAILED: RIGHT CENTRAL MALAR CHEEK
LOCATION DETAILED: LEFT ANTERIOR PROXIMAL THIGH

## 2024-10-10 ASSESSMENT — LOCATION ZONE DERM
LOCATION ZONE: VULVA
LOCATION ZONE: LEG
LOCATION ZONE: FACE